# Patient Record
Sex: MALE | Race: OTHER | HISPANIC OR LATINO | Employment: FULL TIME | URBAN - METROPOLITAN AREA
[De-identification: names, ages, dates, MRNs, and addresses within clinical notes are randomized per-mention and may not be internally consistent; named-entity substitution may affect disease eponyms.]

---

## 2017-12-26 ENCOUNTER — APPOINTMENT (EMERGENCY)
Dept: ULTRASOUND IMAGING | Facility: HOSPITAL | Age: 42
End: 2017-12-26
Payer: COMMERCIAL

## 2017-12-26 ENCOUNTER — APPOINTMENT (EMERGENCY)
Dept: RADIOLOGY | Facility: HOSPITAL | Age: 42
End: 2017-12-26
Payer: COMMERCIAL

## 2017-12-26 ENCOUNTER — HOSPITAL ENCOUNTER (EMERGENCY)
Facility: HOSPITAL | Age: 42
Discharge: HOME | End: 2017-12-26
Attending: EMERGENCY MEDICINE | Admitting: EMERGENCY MEDICINE
Payer: COMMERCIAL

## 2017-12-26 VITALS
SYSTOLIC BLOOD PRESSURE: 138 MMHG | RESPIRATION RATE: 16 BRPM | HEART RATE: 84 BPM | OXYGEN SATURATION: 100 % | TEMPERATURE: 97.9 F | WEIGHT: 235.89 LBS | DIASTOLIC BLOOD PRESSURE: 86 MMHG

## 2017-12-26 DIAGNOSIS — M79.673 HEEL PAIN: Primary | ICD-10-CM

## 2017-12-26 PROCEDURE — 93971 EXTREMITY STUDY: CPT

## 2017-12-26 PROCEDURE — 73630 X-RAY EXAM OF FOOT: CPT

## 2017-12-26 PROCEDURE — 73600 X-RAY EXAM OF ANKLE: CPT

## 2017-12-26 PROCEDURE — 96372 THER/PROPH/DIAG INJ SC/IM: CPT

## 2017-12-26 PROCEDURE — 99284 EMERGENCY DEPT VISIT MOD MDM: CPT

## 2017-12-26 RX ORDER — KETOROLAC TROMETHAMINE 30 MG/ML
15 INJECTION, SOLUTION INTRAMUSCULAR; INTRAVENOUS ONCE
Status: COMPLETED | OUTPATIENT
Start: 2017-12-26 | End: 2017-12-26

## 2017-12-26 RX ORDER — ACETAMINOPHEN 325 MG/1
975 TABLET ORAL ONCE
Status: COMPLETED | OUTPATIENT
Start: 2017-12-26 | End: 2017-12-26

## 2017-12-26 RX ADMIN — ACETAMINOPHEN 975 MG: 325 TABLET ORAL at 14:13

## 2017-12-26 RX ADMIN — KETOROLAC TROMETHAMINE 15 MG: 30 INJECTION, SOLUTION INTRAMUSCULAR at 14:14

## 2017-12-26 NOTE — ED PROVIDER NOTES
History  Chief Complaint   Patient presents with    Foot Pain     left foot and ankle pain for one month     Patient is a 14-year-old male that reports to the emergency department with swelling and pain in his left lower extremity for the past month  He has point tenderness along the insertion of the Achilles tendon with no other areas of tenderness  However he does have some swelling throughout the entire ankle and up the calf several inches  No tenderness further up the calf or calf pain  No chest pain or shortness of breath  Heart rate normal during my exam   No systemic symptoms  Feeling well otherwise  He notes that he may have bumped the back of his heel about a month ago but does not recall the exact mechanism  Medical decision making:  14-year-old male, suspect that he has Achilles tendon inflammation along the insertion of the Achilles tendon, however, will rule out fracture with x-ray as well as DVT  None       History reviewed  No pertinent past medical history  Past Surgical History:   Procedure Laterality Date    APPENDECTOMY         History reviewed  No pertinent family history  I have reviewed and agree with the history as documented  Social History   Substance Use Topics    Smoking status: Never Smoker    Smokeless tobacco: Never Used    Alcohol use No        Review of Systems   Constitutional: Negative for chills and fever  HENT: Negative for ear pain and hearing loss  Respiratory: Negative for chest tightness and shortness of breath  Cardiovascular: Negative for chest pain and leg swelling  Gastrointestinal: Negative for abdominal pain, diarrhea and nausea  Genitourinary: Negative for dysuria and hematuria  Musculoskeletal: Negative for joint swelling and neck stiffness  Skin: Negative for rash  Neurological: Negative for seizures and headaches  Psychiatric/Behavioral: Negative for hallucinations and suicidal ideas     All other systems reviewed and are negative  Physical Exam  ED Triage Vitals [12/26/17 1324]   Temperature Pulse Respirations Blood Pressure SpO2   97 9 °F (36 6 °C) (!) 107 20 154/92 99 %      Temp Source Heart Rate Source Patient Position - Orthostatic VS BP Location FiO2 (%)   Oral Monitor Sitting Right arm --      Pain Score       Worst Possible Pain           Orthostatic Vital Signs  Vitals:    12/26/17 1324 12/26/17 1615   BP: 154/92 138/86   Pulse: (!) 107 84   Patient Position - Orthostatic VS: Sitting        Physical Exam   Constitutional: He is oriented to person, place, and time  He appears well-developed and well-nourished  HENT:   Head: Normocephalic and atraumatic  Eyes: EOM are normal  Pupils are equal, round, and reactive to light  Neck: Normal range of motion  Neck supple  Cardiovascular: Normal rate, regular rhythm and normal heart sounds  No murmur heard  Pulmonary/Chest: Effort normal and breath sounds normal  No respiratory distress  He has no wheezes  Abdominal: Soft  Bowel sounds are normal  He exhibits no distension  There is no tenderness  Musculoskeletal: Normal range of motion  He exhibits edema and tenderness  Neurological: He is alert and oriented to person, place, and time  No cranial nerve deficit  Coordination normal    Skin: Skin is warm and dry  He is not diaphoretic  No erythema  Psychiatric: He has a normal mood and affect  His behavior is normal    Nursing note and vitals reviewed  ED Medications  Medications   ketorolac (TORADOL) injection 15 mg (15 mg Intramuscular Given 12/26/17 1414)   acetaminophen (TYLENOL) tablet 975 mg (975 mg Oral Given 12/26/17 1413)       Diagnostic Studies  Results Reviewed     None                 VAS lower limb venous duplex study, unilateral/limited   Final Result by Suresh Graff MD (12/27 0011)      XR foot 3+ views LEFT   Final Result by Emmett Peterson MD (12/26 1506)      No acute osseous abnormality           Workstation performed: [id number]         XR ankle 2 views LEFT   Final Result by Tiffany Gutierrez MD (12/26 1486)      No acute osseous abnormality  Workstation performed: [id number]                    Procedures  Procedures       Phone Contacts  ED Phone Contact    ED Course  ED Course                                MDM  CritCare Time    Disposition  Final diagnoses:   Heel pain     Time reflects when diagnosis was documented in both MDM as applicable and the Disposition within this note     Time User Action Codes Description Comment    12/26/2017  4:03 PM Qian Ramirez Add [X88 [id number]] Heel pain       ED Disposition     ED Disposition Condition Comment    Discharge  [address] [address] discharge to home/self care  Condition at discharge: Good        Follow-up Information     Follow up With Specialties Details [facility] [address]  In 1 day  [address]  [phone]        There are no discharge medications for this patient  No discharge procedures on file      ED Provider  Electronically Signed by           Fely Saldivar MD  12/28/17 7588

## 2017-12-26 NOTE — ED NOTES
US at bedside      Jayna Wright [facility]  12/26/17 1522

## 2017-12-26 NOTE — DISCHARGE INSTRUCTIONS
Arthralgia   WHAT YOU NEED TO KNOW:   Arthralgia is pain in one or more joints, with no inflammation  It may be short-term and get better within 6 to 8 weeks  Arthralgia can be an early sign of arthritis  Arthralgia may be caused by a medical condition, such as a hormone disorder or a tumor  It may also be caused by an infection or injury  DISCHARGE INSTRUCTIONS:   Medicines: The following medicines may  be ordered for you:  · Acetaminophen  decreases pain  Ask how much to take and how often to take it  Follow directions  Acetaminophen can cause liver damage if not taken correctly  · NSAIDs  decrease pain and prevent swelling  Ask your healthcare provider which medicine is right for you  Ask how much to take and when to take it  Take as directed  NSAIDs can cause stomach bleeding and kidney problems if not taken correctly  · Pain relief cream  decreases pain  Use this cream as directed  · Take your medicine as directed  Contact your healthcare provider if you think your medicine is not helping or if you have side effects  Tell him of her if you are allergic to any medicine  Keep a list of the medicines, vitamins, and herbs you take  Include the amounts, and when and why you take them  Bring the list or the pill bottles to follow-up visits  Carry your medicine list with you in case of an emergency  Follow up with your healthcare provider or specialist as directed:  Write down your questions so you remember to ask them during your visits  Self-care:   · Apply heat  to help decrease pain  Use a heating pad or heat wrap  Apply heat for 20 to 30 minutes every 2 hours for as many days as directed  · Rest  as much as possible  Avoid activities that cause joint pain  · Apply ice  to help decrease swelling and pain  Ice may also help prevent tissue damage  Use an ice pack, or put crushed ice in a plastic bag   Cover it with a towel and place it on your painful joint for 15 to 20 minutes every hour or as directed  · Support  the joint with a brace or elastic wrap as directed  · Elevate  your joint above the level of your heart as often as you can to help decrease swelling and pain  Prop your painful joint on pillows or blankets to keep it elevated comfortably  · Lose weight  if you are overweight  Extra weight can put pressure on your joints and cause more pain  Ask your healthcare provider how much you should weigh  Ask him to help you create a weight loss plan  · Exercise  regularly to help improve joint movement and to decrease pain  Ask about the best exercise plan for you  Low-impact exercises can help take the pressure off your joints  Examples are walking, swimming, and water aerobics  Physical therapy:  A physical therapist teaches you exercises to help improve movement and strength, and to decrease pain  Ask your healthcare provider if physical therapy is right for you  Contact your healthcare provider or specialist if:   · You have a fever  · You continue to have joint pain that cannot be relieved with heat, ice, or medicine  · You have pain and inflammation around your joint  · You have questions or concerns about your condition or care  Return to the emergency department if:   · You have sudden, severe pain when you move your joint  · You have a fever and shaking chills  · You cannot move your joint  · You lose feeling on the side of your body where you have the painful joint  © 2017 [employer]  Information is for End User's use only and may not be sold, redistributed or otherwise used for commercial purposes  All illustrations and images included in CareNotes® are the copyrighted property of A D A M [employer] Inc  or [employer] Carras[employer]  The above information is an  only  It is not intended as medical advice for individual conditions or treatments   Talk to your doctor, nurse or pharmacist before following any medical regimen to see if it is safe and effective for you

## 2022-01-10 ENCOUNTER — APPOINTMENT (EMERGENCY)
Dept: CT IMAGING | Facility: HOSPITAL | Age: 47
DRG: 177 | End: 2022-01-10
Payer: COMMERCIAL

## 2022-01-10 ENCOUNTER — APPOINTMENT (EMERGENCY)
Dept: RADIOLOGY | Facility: HOSPITAL | Age: 47
DRG: 177 | End: 2022-01-10
Payer: COMMERCIAL

## 2022-01-10 ENCOUNTER — HOSPITAL ENCOUNTER (INPATIENT)
Facility: HOSPITAL | Age: 47
LOS: 5 days | Discharge: HOME | DRG: 177 | End: 2022-01-15
Attending: EMERGENCY MEDICINE | Admitting: HOSPITALIST
Payer: COMMERCIAL

## 2022-01-10 DIAGNOSIS — E87.6 HYPOKALEMIA: ICD-10-CM

## 2022-01-10 DIAGNOSIS — U07.1 ACUTE HYPOXEMIC RESPIRATORY FAILURE DUE TO COVID-19 (HCC): Primary | ICD-10-CM

## 2022-01-10 DIAGNOSIS — J96.01 ACUTE HYPOXEMIC RESPIRATORY FAILURE DUE TO COVID-19 (HCC): Primary | ICD-10-CM

## 2022-01-10 DIAGNOSIS — E87.1 HYPONATREMIA: ICD-10-CM

## 2022-01-10 PROBLEM — E66.01 MORBID OBESITY WITH BMI OF 40.0-44.9, ADULT (HCC): Status: ACTIVE | Noted: 2022-01-10

## 2022-01-10 LAB
2HR DELTA HS TROPONIN: -7 NG/L
ALBUMIN SERPL BCP-MCNC: 2.6 G/DL (ref 3.5–5)
ALP SERPL-CCNC: 111 U/L (ref 46–116)
ALT SERPL W P-5'-P-CCNC: 72 U/L (ref 12–78)
ANION GAP SERPL CALCULATED.3IONS-SCNC: 10 MMOL/L (ref 4–13)
ANISOCYTOSIS BLD QL SMEAR: PRESENT
APTT PPP: 28 SECONDS (ref 23–37)
AST SERPL W P-5'-P-CCNC: 58 U/L (ref 5–45)
BACTERIA UR QL AUTO: ABNORMAL /HPF
BASOPHILS # BLD MANUAL: 0 THOUSAND/UL (ref 0–0.1)
BASOPHILS NFR MAR MANUAL: 0 % (ref 0–1)
BILIRUB SERPL-MCNC: 1.9 MG/DL (ref 0.2–1)
BILIRUB UR QL STRIP: ABNORMAL
BUN SERPL-MCNC: 24 MG/DL (ref 5–25)
CALCIUM ALBUM COR SERPL-MCNC: 10 MG/DL (ref 8.3–10.1)
CALCIUM SERPL-MCNC: 8.9 MG/DL (ref 8.3–10.1)
CARDIAC TROPONIN I PNL SERPL HS: 10 NG/L
CARDIAC TROPONIN I PNL SERPL HS: 17 NG/L
CHLORIDE SERPL-SCNC: 90 MMOL/L (ref 100–108)
CLARITY UR: CLEAR
CO2 SERPL-SCNC: 30 MMOL/L (ref 21–32)
COLOR UR: YELLOW
CREAT SERPL-MCNC: 1.41 MG/DL (ref 0.6–1.3)
D DIMER PPP FEU-MCNC: 0.99 UG/ML FEU
EOSINOPHIL # BLD MANUAL: 0.11 THOUSAND/UL (ref 0–0.4)
EOSINOPHIL NFR BLD MANUAL: 1 % (ref 0–6)
ERYTHROCYTE [DISTWIDTH] IN BLOOD BY AUTOMATED COUNT: 13.6 % (ref 11.6–15.1)
FINE GRAN CASTS URNS QL MICRO: ABNORMAL /LPF
FLUAV RNA RESP QL NAA+PROBE: NEGATIVE
FLUBV RNA RESP QL NAA+PROBE: NEGATIVE
GFR SERPL CREATININE-BSD FRML MDRD: 58 ML/MIN/1.73SQ M
GLUCOSE SERPL-MCNC: 124 MG/DL (ref 65–140)
GLUCOSE UR STRIP-MCNC: NEGATIVE MG/DL
HCT VFR BLD AUTO: 45.3 % (ref 36.5–49.3)
HGB BLD-MCNC: 15.6 G/DL (ref 12–17)
HGB UR QL STRIP.AUTO: ABNORMAL
INR PPP: 1.04 (ref 0.84–1.19)
KETONES UR STRIP-MCNC: NEGATIVE MG/DL
LACTATE SERPL-SCNC: 1.5 MMOL/L (ref 0.5–2)
LACTATE SERPL-SCNC: 2.1 MMOL/L (ref 0.5–2)
LEUKOCYTE ESTERASE UR QL STRIP: NEGATIVE
LYMPHOCYTES # BLD AUTO: 0.99 THOUSAND/UL (ref 0.6–4.47)
LYMPHOCYTES # BLD AUTO: 9 % (ref 14–44)
MAGNESIUM SERPL-MCNC: 2.6 MG/DL (ref 1.6–2.6)
MCH RBC QN AUTO: 31.1 PG (ref 26.8–34.3)
MCHC RBC AUTO-ENTMCNC: 34.4 G/DL (ref 31.4–37.4)
MCV RBC AUTO: 90 FL (ref 82–98)
MONOCYTES # BLD AUTO: 1.32 THOUSAND/UL (ref 0–1.22)
MONOCYTES NFR BLD: 12 % (ref 4–12)
NEUTROPHILS # BLD MANUAL: 8.59 THOUSAND/UL (ref 1.85–7.62)
NEUTS SEG NFR BLD AUTO: 78 % (ref 43–75)
NITRITE UR QL STRIP: NEGATIVE
NON-SQ EPI CELLS URNS QL MICRO: ABNORMAL /HPF
NT-PROBNP SERPL-MCNC: 46 PG/ML
PH UR STRIP.AUTO: 6.5 [PH]
PLATELET # BLD AUTO: 499 THOUSANDS/UL (ref 149–390)
PLATELET BLD QL SMEAR: ABNORMAL
PMV BLD AUTO: 9.6 FL (ref 8.9–12.7)
POTASSIUM SERPL-SCNC: 2.7 MMOL/L (ref 3.5–5.3)
PROT SERPL-MCNC: 8.5 G/DL (ref 6.4–8.2)
PROT UR STRIP-MCNC: ABNORMAL MG/DL
PROTHROMBIN TIME: 13.2 SECONDS (ref 11.6–14.5)
RBC # BLD AUTO: 5.01 MILLION/UL (ref 3.88–5.62)
RBC #/AREA URNS AUTO: ABNORMAL /HPF
RSV RNA RESP QL NAA+PROBE: NEGATIVE
SARS-COV-2 RNA RESP QL NAA+PROBE: POSITIVE
SODIUM SERPL-SCNC: 130 MMOL/L (ref 136–145)
SP GR UR STRIP.AUTO: 1.01 (ref 1–1.03)
UROBILINOGEN UR QL STRIP.AUTO: 0.2 E.U./DL
WBC # BLD AUTO: 11.01 THOUSAND/UL (ref 4.31–10.16)
WBC #/AREA URNS AUTO: ABNORMAL /HPF
WBC CASTS URNS QL MICRO: ABNORMAL /LPF

## 2022-01-10 PROCEDURE — G1004 CDSM NDSC: HCPCS

## 2022-01-10 PROCEDURE — 96361 HYDRATE IV INFUSION ADD-ON: CPT

## 2022-01-10 PROCEDURE — 83880 ASSAY OF NATRIURETIC PEPTIDE: CPT | Performed by: EMERGENCY MEDICINE

## 2022-01-10 PROCEDURE — 83605 ASSAY OF LACTIC ACID: CPT | Performed by: EMERGENCY MEDICINE

## 2022-01-10 PROCEDURE — 85730 THROMBOPLASTIN TIME PARTIAL: CPT | Performed by: EMERGENCY MEDICINE

## 2022-01-10 PROCEDURE — 85007 BL SMEAR W/DIFF WBC COUNT: CPT | Performed by: EMERGENCY MEDICINE

## 2022-01-10 PROCEDURE — 71045 X-RAY EXAM CHEST 1 VIEW: CPT

## 2022-01-10 PROCEDURE — 83735 ASSAY OF MAGNESIUM: CPT | Performed by: EMERGENCY MEDICINE

## 2022-01-10 PROCEDURE — 99285 EMERGENCY DEPT VISIT HI MDM: CPT

## 2022-01-10 PROCEDURE — 94760 N-INVAS EAR/PLS OXIMETRY 1: CPT

## 2022-01-10 PROCEDURE — 99285 EMERGENCY DEPT VISIT HI MDM: CPT | Performed by: EMERGENCY MEDICINE

## 2022-01-10 PROCEDURE — 0241U HB NFCT DS VIR RESP RNA 4 TRGT: CPT | Performed by: EMERGENCY MEDICINE

## 2022-01-10 PROCEDURE — 85610 PROTHROMBIN TIME: CPT | Performed by: EMERGENCY MEDICINE

## 2022-01-10 PROCEDURE — 99223 1ST HOSP IP/OBS HIGH 75: CPT | Performed by: HOSPITALIST

## 2022-01-10 PROCEDURE — 36415 COLL VENOUS BLD VENIPUNCTURE: CPT | Performed by: EMERGENCY MEDICINE

## 2022-01-10 PROCEDURE — 85379 FIBRIN DEGRADATION QUANT: CPT | Performed by: EMERGENCY MEDICINE

## 2022-01-10 PROCEDURE — 80053 COMPREHEN METABOLIC PANEL: CPT | Performed by: EMERGENCY MEDICINE

## 2022-01-10 PROCEDURE — 85027 COMPLETE CBC AUTOMATED: CPT | Performed by: EMERGENCY MEDICINE

## 2022-01-10 PROCEDURE — 96360 HYDRATION IV INFUSION INIT: CPT

## 2022-01-10 PROCEDURE — XW033E5 INTRODUCTION OF REMDESIVIR ANTI-INFECTIVE INTO PERIPHERAL VEIN, PERCUTANEOUS APPROACH, NEW TECHNOLOGY GROUP 5: ICD-10-PCS | Performed by: HOSPITALIST

## 2022-01-10 PROCEDURE — 71275 CT ANGIOGRAPHY CHEST: CPT

## 2022-01-10 PROCEDURE — 81001 URINALYSIS AUTO W/SCOPE: CPT | Performed by: EMERGENCY MEDICINE

## 2022-01-10 PROCEDURE — 84484 ASSAY OF TROPONIN QUANT: CPT | Performed by: EMERGENCY MEDICINE

## 2022-01-10 PROCEDURE — 93005 ELECTROCARDIOGRAM TRACING: CPT

## 2022-01-10 RX ORDER — DEXAMETHASONE SODIUM PHOSPHATE 4 MG/ML
6 INJECTION, SOLUTION INTRA-ARTICULAR; INTRALESIONAL; INTRAMUSCULAR; INTRAVENOUS; SOFT TISSUE EVERY 24 HOURS
Status: DISCONTINUED | OUTPATIENT
Start: 2022-01-10 | End: 2022-01-15 | Stop reason: HOSPADM

## 2022-01-10 RX ORDER — ACETAMINOPHEN 325 MG/1
650 TABLET ORAL EVERY 4 HOURS PRN
Status: DISCONTINUED | OUTPATIENT
Start: 2022-01-10 | End: 2022-01-15 | Stop reason: HOSPADM

## 2022-01-10 RX ORDER — FAMOTIDINE 20 MG/1
20 TABLET, FILM COATED ORAL 2 TIMES DAILY
Status: DISCONTINUED | OUTPATIENT
Start: 2022-01-10 | End: 2022-01-15 | Stop reason: HOSPADM

## 2022-01-10 RX ORDER — POTASSIUM CHLORIDE AND SODIUM CHLORIDE 900; 300 MG/100ML; MG/100ML
100 INJECTION, SOLUTION INTRAVENOUS CONTINUOUS
Status: DISCONTINUED | OUTPATIENT
Start: 2022-01-10 | End: 2022-01-11

## 2022-01-10 RX ORDER — POTASSIUM CHLORIDE 20 MEQ/1
40 TABLET, EXTENDED RELEASE ORAL ONCE
Status: COMPLETED | OUTPATIENT
Start: 2022-01-10 | End: 2022-01-10

## 2022-01-10 RX ORDER — BENZONATATE 100 MG/1
100 CAPSULE ORAL 3 TIMES DAILY PRN
Status: DISCONTINUED | OUTPATIENT
Start: 2022-01-10 | End: 2022-01-15 | Stop reason: HOSPADM

## 2022-01-10 RX ORDER — SODIUM CHLORIDE 9 MG/ML
125 INJECTION, SOLUTION INTRAVENOUS CONTINUOUS
Status: DISCONTINUED | OUTPATIENT
Start: 2022-01-10 | End: 2022-01-10

## 2022-01-10 RX ORDER — POTASSIUM CHLORIDE 20 MEQ/1
40 TABLET, EXTENDED RELEASE ORAL 2 TIMES DAILY
Status: COMPLETED | OUTPATIENT
Start: 2022-01-10 | End: 2022-01-11

## 2022-01-10 RX ORDER — ALBUTEROL SULFATE 90 UG/1
2 AEROSOL, METERED RESPIRATORY (INHALATION) EVERY 4 HOURS PRN
Status: DISCONTINUED | OUTPATIENT
Start: 2022-01-10 | End: 2022-01-15 | Stop reason: HOSPADM

## 2022-01-10 RX ORDER — ONDANSETRON 2 MG/ML
4 INJECTION INTRAMUSCULAR; INTRAVENOUS EVERY 4 HOURS PRN
Status: DISCONTINUED | OUTPATIENT
Start: 2022-01-10 | End: 2022-01-15 | Stop reason: HOSPADM

## 2022-01-10 RX ADMIN — SODIUM CHLORIDE 125 ML/HR: 0.9 INJECTION, SOLUTION INTRAVENOUS at 12:47

## 2022-01-10 RX ADMIN — REMDESIVIR 200 MG: 100 INJECTION, POWDER, LYOPHILIZED, FOR SOLUTION INTRAVENOUS at 16:33

## 2022-01-10 RX ADMIN — IOHEXOL 100 ML: 350 INJECTION, SOLUTION INTRAVENOUS at 13:48

## 2022-01-10 RX ADMIN — BENZONATATE 100 MG: 100 CAPSULE ORAL at 20:46

## 2022-01-10 RX ADMIN — FAMOTIDINE 20 MG: 20 TABLET ORAL at 17:29

## 2022-01-10 RX ADMIN — ALBUTEROL SULFATE 2 PUFF: 90 AEROSOL, METERED RESPIRATORY (INHALATION) at 20:13

## 2022-01-10 RX ADMIN — POTASSIUM CHLORIDE 40 MEQ: 1500 TABLET, EXTENDED RELEASE ORAL at 15:05

## 2022-01-10 RX ADMIN — ENOXAPARIN SODIUM 40 MG: 40 INJECTION SUBCUTANEOUS at 20:46

## 2022-01-10 RX ADMIN — POTASSIUM CHLORIDE AND SODIUM CHLORIDE 100 ML/HR: 900; 300 INJECTION, SOLUTION INTRAVENOUS at 17:29

## 2022-01-10 RX ADMIN — POTASSIUM CHLORIDE 40 MEQ: 1500 TABLET, EXTENDED RELEASE ORAL at 17:29

## 2022-01-10 RX ADMIN — DEXAMETHASONE SODIUM PHOSPHATE 6 MG: 4 INJECTION, SOLUTION INTRA-ARTICULAR; INTRALESIONAL; INTRAMUSCULAR; INTRAVENOUS; SOFT TISSUE at 16:33

## 2022-01-10 NOTE — H&P
[facility] 1975[facility] 51 [facility]  [facility] MRN: [id number]  Unit/Bed#: ED 14 Encounter: [id number]  Primary Care Provider: No primary care provider on file  Date and time admitted to hospital: 1/10/2022 12:21 PM         [facility] - History and Physical:       Deshaun Baez 51 y o  male MRN: [id number]  Unit/Bed#: ED 14 Encounter: [id number]  Admitting Physician: Augusto Pink MD  PCP: No primary care provider on file  Date of Admission:  01/10/22        Assessment and Plan:     * Acute hypoxemic respiratory failure due to COVID-19 [facility])  Assessment & Plan  · Continue mid flow oxygen for now and will titrate as needed  · Continue isolation protocols  · Will start IV remdesivir and IV Decadron  · Tylenol as needed for fevers  · Albuterol inhalers needed for fevers  · Start patient on clear liquid diet and advance as tolerated  · Continue symptomatic management    Hyponatremia  Assessment & Plan  · Will hydrate with IV fluids for 12 hours  · Check labs in a m  Hypokalemia  Assessment & Plan  · Will order KCl oral and IV fluids with KCl times 12 hours  · Recheck labs in a m  Morbid obesity with BMI of 40 0-44 9, adult ([facility] [address] )  Assessment & Plan  · Outpatient follow-up            VTE Prophylaxis: Enoxaparin (Lovenox)  / sequential compression device   Code Status: full    Anticipated Length of Stay:  Patient will be admitted on an Inpatient basis with an anticipated length of stay of  2 midnights  Justification for Hospital Stay:  COVID-19    Total Time for Visit, including Counseling / Coordination of Care: 30 minutes  Greater than 50% of this total time spent on direct patient counseling and coordination of care  Chief Complaint:  COVID symptoms    History of Present Illness:    Deshaun Baez is a 51 y o  male who presented to the ED with complaints of shortness of breath that has been progressively worsening over the past week    He tested positive for COVID-19 on 12/26 and he did receive 2 doses of the Moderna vaccine  He went to the urgent care and was found hypoxic and was sent to the emergency room for further management  In the ED the patient again was found to be hypoxic and was started on mid flow oxygen  He did complain of poor appetite and generalized weakness and fatigue  He denied any fevers or chills  Review of Systems:    Review of Systems   Constitutional: Positive for appetite change and fatigue  HENT: Negative  Eyes: Negative  Respiratory: Positive for shortness of breath  Cardiovascular: Negative  Gastrointestinal: Negative  Endocrine: Negative  Genitourinary: Negative  Musculoskeletal: Negative  Skin: Negative  Neurological: Negative  Psychiatric/Behavioral: Negative  Past Medical and Surgical History:     No past medical history on file  Past Surgical History:   Procedure Laterality Date    APPENDECTOMY         Meds/Allergies:    Prior to Admission medications    Not on File     I have reviewed home medications with patient personally  Allergies: No Known Allergies    Social History:     Marital Status: Single     Social History     Substance and Sexual Activity   Alcohol Use No     Social History     Tobacco Use   Smoking Status Never Smoker   Smokeless Tobacco Never Used     Social History     Substance and Sexual Activity   Drug Use No       Family History:    non-contributory    Physical Exam:     Vitals:   Blood Pressure: 103/56 (01/10/22 1500)  Pulse: 97 (01/10/22 1500)  Temperature: 99 5 °F (37 5 °C) (01/10/22 1228)  Temp Source: Oral (01/10/22 1228)  Respirations: (!) 28 (01/10/22 1500)  Height: 5' 5" (165 1 cm) (01/10/22 1228)  Weight - Scale: 113 kg (250 lb) (01/10/22 1228)  SpO2: 92 % (01/10/22 1500)    Physical Exam  Constitutional:       Appearance: He is obese  HENT:      Head: Normocephalic and atraumatic  Eyes:      Extraocular Movements: Extraocular movements intact  Pupils: Pupils are equal, round, and reactive to light  Cardiovascular:      Rate and Rhythm: Normal rate and regular rhythm  Pulmonary:      Effort: Pulmonary effort is normal       Breath sounds: Normal breath sounds  Abdominal:      General: Bowel sounds are normal       Palpations: Abdomen is soft  Musculoskeletal:         General: Normal range of motion  Cervical back: Neck supple  Skin:     General: Skin is warm and dry  Neurological:      Mental Status: He is alert and oriented to person, place, and time  Psychiatric:         Mood and Affect: Mood normal        Additional Data:     Lab Results: I have personally reviewed pertinent reports  Results from last 7 days   Lab Units 01/10/22  1244   WBC Thousand/uL 11 01*   HEMOGLOBIN g/dL 15 6   HEMATOCRIT % 45 3   PLATELETS Thousands/uL 499*   LYMPHO PCT % 9*   MONO PCT % 12   EOS PCT % 1     Results from last 7 days   Lab Units 01/10/22  1244   SODIUM mmol/L 130*   POTASSIUM mmol/L 2 7*   CHLORIDE mmol/L 90*   CO2 mmol/L 30   BUN mg/dL 24   CREATININE mg/dL 1 41*   ANION GAP mmol/L 10   CALCIUM mg/dL 8 9   ALBUMIN g/dL 2 6*   TOTAL BILIRUBIN mg/dL 1 90*   ALK PHOS U/L 111   ALT U/L 72   AST U/L 58*   GLUCOSE RANDOM mg/dL 124     Results from last 7 days   Lab Units 01/10/22  1244   INR  1 04             Results from last 7 days   Lab Units 01/10/22  1508 01/10/22  1244   LACTIC ACID mmol/L 1 5 2 1*       Imaging: I have personally reviewed pertinent reports  CTA ED chest PE study   Final Result by Valeri Lynch MD (01/10 1423)      No evidence for pulmonary embolism  Multifocal areas of consolidation throughout the lungs bilaterally consistent with Covid-19 pneumonia  Workstation performed: [id number]         XR chest 1 view portable   ED Interpretation by Ivone Galicia MD (01/10 1323)   Diffuse significant infiltrates      Final Result by Taylor Phillips DO (01/10 1503)      Multilobar pneumonia    The right clinical setting, Covid pneumonia should be considered  Workstation performed: [id number]             Allscripts / [facility] Records Reviewed: Yes     ** Please Note: This note has been constructed using a voice recognition system   **

## 2022-01-10 NOTE — ED PROVIDER NOTES
History  Chief Complaint   Patient presents with    Shortness of Breath     pt c/o sob, states he went to urgent care and was told to come here for low oxygen  54yo male is coming in with c/o dyspnea, significant worsening over the past week  He tested positive for COVID at a pharmacy on 12/26  He got the COVID moderna vaccines via a pharmacy on 11/26 and 12/17  He was well for the first week and had more mild symptoms and everyone else had COVID and multiple people were sick, they all got better and recovered and he has just gotten worse and has gotten SOB  History provided by:  Patient  URI  Presenting symptoms: congestion and cough    Presenting symptoms: no fever    Congestion:     Location:  Chest  Cough:     Cough characteristics:  Productive    Sputum characteristics:  Nondescript    Severity:  Moderate    Onset quality:  Gradual    Duration:  2 weeks    Timing:  Constant    Progression:  Worsening  Severity:  Severe  Onset quality:  Gradual  Duration:  2 weeks  Timing:  Constant  Progression:  Worsening  Chronicity:  New  Relieved by:  Nothing  Worsened by:  Nothing  Ineffective treatments:  None tried  Associated symptoms: myalgias    Risk factors: sick contacts        None       No past medical history on file  Past Surgical History:   Procedure Laterality Date    APPENDECTOMY         No family history on file  I have reviewed and agree with the history as documented  E-Cigarette/Vaping     E-Cigarette/Vaping Substances     Social History     Tobacco Use    Smoking status: Never Smoker    Smokeless tobacco: Never Used   Substance Use Topics    Alcohol use: No    Drug use: No       Review of Systems   Constitutional: Negative for fever  HENT: Positive for congestion  Respiratory: Positive for cough  Musculoskeletal: Positive for myalgias  All other systems reviewed and are negative  Physical Exam  Physical Exam  Vitals reviewed     Constitutional:       Appearance: He is ill-appearing  HENT:      Head: Normocephalic and atraumatic  Eyes:      Extraocular Movements: Extraocular movements intact  Pupils: Pupils are equal, round, and reactive to light  Cardiovascular:      Rate and Rhythm: Tachycardia present  Pulmonary:      Effort: Tachypnea and accessory muscle usage present  Breath sounds: Rhonchi present  Abdominal:      General: There is no distension  Palpations: Abdomen is soft  Tenderness: There is no abdominal tenderness  Musculoskeletal:      Cervical back: Neck supple  Right lower leg: No edema  Left lower leg: No edema  Skin:     General: Skin is warm  Neurological:      General: No focal deficit present  Mental Status: He is alert     Psychiatric:         Mood and Affect: Mood normal          Vital Signs  ED Triage Vitals   Temperature Pulse Respirations Blood Pressure SpO2   01/10/22 1228 01/10/22 1218 01/10/22 1218 01/10/22 1218 01/10/22 1218   99 5 °F (37 5 °C) (!) 106 (!) 26 146/82 (!) 80 %      Temp Source Heart Rate Source Patient Position - Orthostatic VS BP Location FiO2 (%)   01/10/22 1228 01/10/22 1218 01/10/22 1218 01/10/22 1218 --   Oral Monitor Sitting Left arm       Pain Score       01/10/22 1228       No Pain           Vitals:    01/10/22 1218 01/10/22 1228 01/10/22 1500   BP: 146/82 111/72 103/56   Pulse: (!) 106 101 97   Patient Position - Orthostatic VS: Sitting           Visual Acuity      ED Medications  Medications   sodium chloride 0 9 % infusion (125 mL/hr Intravenous New Bag 1/10/22 1247)   iohexol (OMNIPAQUE) 350 MG/ML injection (SINGLE-DOSE) 100 mL (100 mL Intravenous Given 1/10/22 1348)   potassium chloride (K-DUR,KLOR-CON) CR tablet 40 mEq (40 mEq Oral Given 1/10/22 1505)       Diagnostic Studies  Results Reviewed     Procedure Component Value Units Date/Time    HS Troponin I 4hr [[id number]]     Lab Status: No result Specimen: Blood     COVID/FLU/RSV - 2 hour TAT [[id number]]  (Abnormal) Collected: 01/10/22 1245    Lab Status: Final result Specimen: Nares from Nose Updated: 01/10/22 1434     SARS-CoV-2 Positive     INFLUENZA A PCR Negative     INFLUENZA B PCR Negative     RSV PCR Negative    Narrative:      FOR PEDIATRIC PATIENTS - copy/paste COVID Guidelines URL to browser: [email or web address]    SARS-CoV-2 assay is a Nucleic Acid Amplification assay intended for the  qualitative detection of nucleic acid from SARS-CoV-2 in nasopharyngeal  swabs  Results are for the presumptive identification of SARS-CoV-2 RNA  Positive results are indicative of infection with SARS-CoV-2, the virus  causing COVID-19, but do not rule out bacterial infection or co-infection  with other viruses  Laboratories within the [place] and its  territories are required to report all positive results to the appropriate  public health authorities  Negative results do not preclude SARS-CoV-2  infection and should not be used as the sole basis for treatment or other  patient management decisions  Negative results must be combined with  clinical observations, patient history, and epidemiological information  This test has not been FDA cleared or approved  This test has been authorized by FDA under an Emergency Use Authorization  (EUA)  This test is only authorized for the duration of time the  declaration that circumstances exist justifying the authorization of the  emergency use of an in vitro diagnostic tests for detection of SARS-CoV-2  virus and/or diagnosis of COVID-19 infection under section 564(b)(1) of  the Act, 21 U  S C  053IQE-9(V)(1), unless the authorization is terminated  or revoked sooner  The test has been validated but independent review by FDA  and CLIA is pending  Test performed using [employer] GeneXpert: This RT-PCR assay targets N2,  a region unique to SARS-CoV-2   A conserved region in the E-gene was chosen  for pan-Sarbecovirus detection which includes SARS-CoV-2  Lactic acid [[id number]]  (Abnormal) Collected: 01/10/22 1244    Lab Status: Final result Specimen: Blood from Arm, Left Updated: 01/10/22 1337     LACTIC ACID 2 1 mmol/L     Narrative:      Result may be elevated if tourniquet was used during collection  Lactic acid 2 Hours [[id number]]     Lab Status: No result Specimen: Blood     CBC and differential [[id number]]  (Abnormal) Collected: 01/10/22 1244    Lab Status: Final result Specimen: Blood from Arm, Left Updated: 01/10/22 1336     WBC 11 01 Thousand/uL      RBC 5 01 Million/uL      Hemoglobin 15 6 g/dL      Hematocrit 45 3 %      MCV 90 fL      MCH 31 1 pg      MCHC 34 4 g/dL      RDW 13 6 %      MPV 9 6 fL      Platelets 363 Thousands/uL     Narrative: This is an appended report  These results have been appended to a previously verified report      Manual Differential(PHLEBS Do Not Order) [[id number]]  (Abnormal) Collected: 01/10/22 1244    Lab Status: Final result Specimen: Blood from Arm, Left Updated: 01/10/22 1336     Segmented % 78 %      Lymphocytes % 9 %      Monocytes % 12 %      Eosinophils, % 1 %      Basophils % 0 %      Absolute Neutrophils 8 59 Thousand/uL      Lymphocytes Absolute 0 99 Thousand/uL      Monocytes Absolute 1 32 Thousand/uL      Eosinophils Absolute 0 11 Thousand/uL      Basophils Absolute 0 00 Thousand/uL      Total Counted --     Anisocytosis Present     Platelet Estimate Increased    Comprehensive metabolic panel [[id number]]  (Abnormal) Collected: 01/10/22 1244    Lab Status: Final result Specimen: Blood from Arm, Left Updated: 01/10/22 1330     Sodium 130 mmol/L      Potassium 2 7 mmol/L      Chloride 90 mmol/L      CO2 30 mmol/L      ANION GAP 10 mmol/L      BUN 24 mg/dL      Creatinine 1 41 mg/dL      Glucose 124 mg/dL      Calcium 8 9 mg/dL      Corrected Calcium 10 0 mg/dL      AST 58 U/L      ALT 72 U/L      Alkaline Phosphatase 111 U/L      Total Protein 8 5 g/dL      Albumin 2 6 g/dL      Total Bilirubin 1 90 mg/dL      eGFR 58 ml/min/1 73sq m     Narrative:      [place] guidelines for Chronic Kidney Disease (CKD):     Stage 1 with normal or high GFR (GFR > 90 mL/min/1 73 square meters)    Stage 2 Mild CKD (GFR = 60-89 mL/min/1 73 square meters)    Stage 3A Moderate CKD (GFR = 45-59 mL/min/1 73 square meters)    Stage 3B Moderate CKD (GFR = 30-44 mL/min/1 73 square meters)    Stage 4 Severe CKD (GFR = 15-29 mL/min/1 73 square meters)    Stage 5 End Stage CKD (GFR <15 mL/min/1 73 square meters)  Note: GFR calculation is accurate only with a steady state creatinine    Magnesium [[id number]]  (Normal) Collected: 01/10/22 1244    Lab Status: Final result Specimen: Blood from Arm, Left Updated: 01/10/22 1321     Magnesium 2 6 mg/dL     NT-BNP PRO [[id number]]  (Normal) Collected: 01/10/22 1244    Lab Status: Final result Specimen: Blood from Arm, Left Updated: 01/10/22 1321     NT-proBNP 46 pg/mL     D-Dimer [[id number]]  (Abnormal) Collected: 01/10/22 1244    Lab Status: Final result Specimen: Blood from Arm, Left Updated: 01/10/22 1321     D-Dimer, Quant 0 99 ug/ml FEU     HS Troponin 0hr (reflex protocol) [[id number]]  (Normal) Collected: 01/10/22 1244    Lab Status: Final result Specimen: Blood from Arm, Left Updated: 01/10/22 1319     hs TnI 0hr 17 ng/L     HS Troponin I 2hr [[id number]]     Lab Status: No result Specimen: Blood     Protime-INR [[id number]]  (Normal) Collected: 01/10/22 1244    Lab Status: Final result Specimen: Blood from Arm, Left Updated: 01/10/22 1307     Protime 13 2 seconds      INR 1 04    APTT [[id number]]  (Normal) Collected: 01/10/22 1244    Lab Status: Final result Specimen: Blood from Arm, Left Updated: 01/10/22 1307     PTT 28 seconds     UA w Reflex to Microscopic w Reflex to Culture [[id number]]     Lab Status: No result Specimen: Urine                  CTA ED chest PE study   Final Result by Cailin Torres MD (01/10 1764)      No evidence for pulmonary embolism  Multifocal areas of consolidation throughout the lungs bilaterally consistent with Covid-19 pneumonia  Workstation performed: [id number]         XR chest 1 view portable   ED Interpretation by Ivone Galicia MD (01/10 1323)   Diffuse significant infiltrates      Final Result by Taylor Phillips DO (01/10 1503)      Multilobar pneumonia  The right clinical setting, Covid pneumonia should be considered  Workstation performed: [id number]                    Procedures  ECG 12 Lead Documentation Only    Date/Time: 1/10/2022 3:07 PM  Performed by: Ivone Galicia MD  Authorized by: Ivone Galicia MD     Indications / Diagnosis:  Dysopnea  ECG reviewed by me, the ED Provider: yes    Patient location:  ED  Rate:     ECG rate:  96    ECG rate assessment: normal    Rhythm:     Rhythm: sinus rhythm    Conduction:     Conduction: normal    T waves:     T waves: normal               ED Course  ED Course as of 01/10/22 1507   Mon Rafi 10, 2022   1456 Respirations(!): 26   1456 SpO2(!): 80 %   1456 SARS-COV-2(!): Positive   1456 Sodium(!): 130   1456 Potassium(!!): 2 7   1456 Chloride(!): 90                               SBIRT 20yo+      Most Recent Value   SBIRT (23 yo +)    In order to provide better care to our patients, we are screening all of our patients for alcohol and drug use  Would it be okay to ask you these screening questions?  No Filed at: 01/10/2022 1227                    MDM  Number of Diagnoses or Management Options  Acute hypoxemic respiratory failure due to COVID-19 [facility]): new and requires workup  Hypokalemia: new and requires workup     Amount and/or Complexity of Data Reviewed  Clinical lab tests: ordered and reviewed  Tests in the radiology section of CPT®: ordered and reviewed  Independent visualization of images, tracings, or specimens: yes    Risk of Complications, Morbidity, and/or Mortality  Presenting problems: high  Management options: high        Disposition  Final diagnoses:   [facility]emic [facility])   Hyponatremia   Hypokalemia     Time reflects when diagnosis was documented in both MDM as applicable and the Disposition within this note     Time User Action Codes Description Comment    1/10/2022 12:46 PM Essie, [address] [U07 1,  J96 01] Acute hypoxemic respiratory failure [facility])     1/10/2022  3:01 PM David Smoker Add [E87 1] Hyponatremia     1/10/2022  3:01 PM Suzette SMITH Add [E87 6] Hypokalemia       ED Disposition     ED Disposition Condition Date/Time Comment    Admit Stable Mon Rafi 10, 2022 12:46 PM       Follow-up Information    None         Patient's Medications    No medications on file       No discharge procedures on file      PDMP Review     None          ED Provider  Electronically Signed by           Regina Gonzalez MD  01/10/22 Marcia Kim MD  01/10/22 5191

## 2022-01-10 NOTE — RESPIRATORY THERAPY NOTE
01/10/22 1301   Respiratory Assessment   Assessment Type Assess only   General Appearance Alert; Awake   Respiratory Pattern Tachypneic   Chest Assessment Chest expansion symmetrical   Bilateral Breath Sounds Diminished;Clear   Cough None   Resp Comments Called to place pt on [address] [facility] due to decreased sats  Will wean as able     O2 Device MFNC   Oxygen Therapy/Pulse Ox   O2 Device Mid flow nasal cannula   O2 Therapy Oxygen humidified   Nasal Cannula O2 Flow Rate (L/min) 10 L/min   Calculated FIO2 (%) - Nasal Cannula 60   SpO2 93 %   SpO2 Activity At Rest   $ Pulse Oximetry Spot Check Charge Completed

## 2022-01-10 NOTE — ASSESSMENT & PLAN NOTE
· Continue mid flow oxygen for now and will titrate as needed  · Continue isolation protocols  · Will start IV remdesivir and IV Decadron  · Tylenol as needed for fevers  · Albuterol inhalers needed for fevers  · Start patient on clear liquid diet and advance as tolerated  · Continue symptomatic management

## 2022-01-11 LAB
ALBUMIN SERPL BCP-MCNC: 2.4 G/DL (ref 3.5–5)
ALP SERPL-CCNC: 105 U/L (ref 46–116)
ALT SERPL W P-5'-P-CCNC: 73 U/L (ref 12–78)
ANION GAP SERPL CALCULATED.3IONS-SCNC: 9 MMOL/L (ref 4–13)
AST SERPL W P-5'-P-CCNC: 47 U/L (ref 5–45)
BILIRUB SERPL-MCNC: 1.25 MG/DL (ref 0.2–1)
BUN SERPL-MCNC: 16 MG/DL (ref 5–25)
CALCIUM ALBUM COR SERPL-MCNC: 10.2 MG/DL (ref 8.3–10.1)
CALCIUM SERPL-MCNC: 8.9 MG/DL (ref 8.3–10.1)
CHLORIDE SERPL-SCNC: 95 MMOL/L (ref 100–108)
CO2 SERPL-SCNC: 29 MMOL/L (ref 21–32)
CREAT SERPL-MCNC: 1.05 MG/DL (ref 0.6–1.3)
ERYTHROCYTE [DISTWIDTH] IN BLOOD BY AUTOMATED COUNT: 13.4 % (ref 11.6–15.1)
GFR SERPL CREATININE-BSD FRML MDRD: 84 ML/MIN/1.73SQ M
GLUCOSE SERPL-MCNC: 194 MG/DL (ref 65–140)
HCT VFR BLD AUTO: 43.1 % (ref 36.5–49.3)
HGB BLD-MCNC: 15 G/DL (ref 12–17)
MAGNESIUM SERPL-MCNC: 2.4 MG/DL (ref 1.6–2.6)
MCH RBC QN AUTO: 31.8 PG (ref 26.8–34.3)
MCHC RBC AUTO-ENTMCNC: 34.8 G/DL (ref 31.4–37.4)
MCV RBC AUTO: 92 FL (ref 82–98)
PLATELET # BLD AUTO: 520 THOUSANDS/UL (ref 149–390)
PMV BLD AUTO: 9.6 FL (ref 8.9–12.7)
POTASSIUM SERPL-SCNC: 3.3 MMOL/L (ref 3.5–5.3)
PROT SERPL-MCNC: 7.9 G/DL (ref 6.4–8.2)
RBC # BLD AUTO: 4.71 MILLION/UL (ref 3.88–5.62)
SODIUM SERPL-SCNC: 133 MMOL/L (ref 136–145)
WBC # BLD AUTO: 8.25 THOUSAND/UL (ref 4.31–10.16)

## 2022-01-11 PROCEDURE — 99233 SBSQ HOSP IP/OBS HIGH 50: CPT | Performed by: INTERNAL MEDICINE

## 2022-01-11 PROCEDURE — 80053 COMPREHEN METABOLIC PANEL: CPT | Performed by: HOSPITALIST

## 2022-01-11 PROCEDURE — 85027 COMPLETE CBC AUTOMATED: CPT | Performed by: HOSPITALIST

## 2022-01-11 PROCEDURE — 83735 ASSAY OF MAGNESIUM: CPT | Performed by: HOSPITALIST

## 2022-01-11 PROCEDURE — 36415 COLL VENOUS BLD VENIPUNCTURE: CPT | Performed by: HOSPITALIST

## 2022-01-11 PROCEDURE — 94760 N-INVAS EAR/PLS OXIMETRY 1: CPT

## 2022-01-11 RX ORDER — POTASSIUM CHLORIDE 20 MEQ/1
40 TABLET, EXTENDED RELEASE ORAL
Status: COMPLETED | OUTPATIENT
Start: 2022-01-11 | End: 2022-01-12

## 2022-01-11 RX ORDER — SODIUM CHLORIDE, SODIUM LACTATE, POTASSIUM CHLORIDE, CALCIUM CHLORIDE 600; 310; 30; 20 MG/100ML; MG/100ML; MG/100ML; MG/100ML
75 INJECTION, SOLUTION INTRAVENOUS CONTINUOUS
Status: DISCONTINUED | OUTPATIENT
Start: 2022-01-11 | End: 2022-01-12

## 2022-01-11 RX ADMIN — ENOXAPARIN SODIUM 40 MG: 40 INJECTION SUBCUTANEOUS at 08:58

## 2022-01-11 RX ADMIN — POTASSIUM CHLORIDE 40 MEQ: 1500 TABLET, EXTENDED RELEASE ORAL at 08:58

## 2022-01-11 RX ADMIN — POTASSIUM CHLORIDE 40 MEQ: 1500 TABLET, EXTENDED RELEASE ORAL at 14:18

## 2022-01-11 RX ADMIN — SODIUM CHLORIDE, SODIUM LACTATE, POTASSIUM CHLORIDE, AND CALCIUM CHLORIDE 75 ML/HR: .6; .31; .03; .02 INJECTION, SOLUTION INTRAVENOUS at 12:01

## 2022-01-11 RX ADMIN — POTASSIUM CHLORIDE 40 MEQ: 1500 TABLET, EXTENDED RELEASE ORAL at 20:29

## 2022-01-11 RX ADMIN — ENOXAPARIN SODIUM 40 MG: 40 INJECTION SUBCUTANEOUS at 20:29

## 2022-01-11 RX ADMIN — FAMOTIDINE 20 MG: 20 TABLET ORAL at 20:29

## 2022-01-11 RX ADMIN — DEXAMETHASONE SODIUM PHOSPHATE 6 MG: 4 INJECTION, SOLUTION INTRA-ARTICULAR; INTRALESIONAL; INTRAMUSCULAR; INTRAVENOUS; SOFT TISSUE at 14:18

## 2022-01-11 RX ADMIN — FAMOTIDINE 20 MG: 20 TABLET ORAL at 08:58

## 2022-01-11 RX ADMIN — REMDESIVIR 100 MG: 100 INJECTION, POWDER, LYOPHILIZED, FOR SOLUTION INTRAVENOUS at 17:19

## 2022-01-11 NOTE — ASSESSMENT & PLAN NOTE
· Continue mid flow oxygen for now and will titrate as needed, needing 12-15 L of oxygen  · Continue isolation protocols  · Will continue IV remdesivir and IV Decadron  · Tylenol as needed for fevers  · Albuterol inhalers needed for fevers  · Patient on clear liquid diet and advancing as tolerated  · Continue symptomatic management    Blood pressure 108/57, pulse 87, temperature 97 8 °F (36 6 °C), temperature source Oral, resp  rate 22, height 5' 5" (1 651 m), weight 113 kg (250 lb), SpO2 95 %

## 2022-01-11 NOTE — UTILIZATION REVIEW
Inpatient Admission Authorization Request   NOTIFICATION OF INPATIENT ADMISSION/INPATIENT AUTHORIZATION REQUEST   SERVICING FACILITY:   [address] [place] [address]  Tax ID: [id number]  NPI: [id number]  Place of Service: [facility]patient [facility]  [address]  Place of Service Code: 24     ATTENDING PROVIDER:  Attending Name and NPI#: Flor Aguilar [[id number]]  Address: [address] [place] [address]  Phone: [phone]     UTILIZATION REVIEW CONTACT:  [facility] Utilization   Network Utilization Review Department  Phone: [phone]  Fax [phone]  Email: [email or web address]     PHYSICIAN ADVISORY SERVICES:  FOR LXDU-GO-DQTH REVIEW - MEDICAL NECESSITY DENIAL  Phone: [phone]  Fax: [phone]  Email: [email or web address]     TYPE OF REQUEST:  Inpatient Status     ADMISSION INFORMATION:  ADMISSION DATE/TIME: 1/10/22  3:02 PM  PATIENT DIAGNOSIS CODE/DESCRIPTION:  Low oxygen saturation [R79 81]  DISCHARGE DATE/TIME: No discharge date for patient encounter  DISCHARGE DISPOSITION (IF DISCHARGED): Home/Self Care     IMPORTANT INFORMATION:  Please contact the [facility] directly with any questions or concerns regarding this request  Department voicemails are confidential     Send requests for admission clinical reviews, concurrent reviews, approvals, and administrative denials due to lack of clinical to fax [phone]

## 2022-01-11 NOTE — UTILIZATION REVIEW
Initial Clinical Review    Admission: Date/Time/Statement:   Admission Orders (From admission, onward)     Ordered        01/10/22 1502  Inpatient Admission  Once                      Orders Placed This Encounter   Procedures    Inpatient Admission     Standing Status:   Standing     Number of Occurrences:   1     Order Specific Question:   Level of Care     Answer:   Med Surg [16]     Order Specific Question:   Estimated length of stay     Answer:   More than 2 Midnights     Order Specific Question:   Certification     Answer:   I certify that inpatient services are medically necessary for this patient for a duration of greater than two midnights  See H&P and MD Progress Notes for additional information about the patient's course of treatment  ED Arrival Information     Expected Arrival Acuity    - 1/10/2022 12:12 Emergent         Means of arrival Escorted by Service Admission type    [facility] Emergency         Arrival complaint    LOW OXYGEN         Chief Complaint   Patient presents with    Shortness of Breath     pt c/o sob, states he went to urgent care and was told to come here for low oxygen  Initial Presentation: 52 yo male to ED from home w/ SOB and progressively worsening over the past week   Tested + COVID on 12/26   Went to urgent care and found to be hypoxic and sent to ED   O2 sat 80 % on arrival on 10 l mid flow  Admitted IP status for acute hypoxemic resp failure d/t COVID plan for remdesivir , decadron , inhalers , titrate O2 as needed  Hyponatremia give IVF and recheck in am     K 2 7 repleted and recheck in am      Date: 1/11   Day 2: pt w/ significant SOB , present at rest and inc on exertion   + mild cough   On 12-15 l O2 , titrate as able  Cont iv remdesivir and iv decadron   clear liq adv as coleman    Hyponatremia improved , recheck labs in am       ED Triage Vitals   Temperature Pulse Respirations Blood Pressure SpO2   01/10/22 1228 01/10/22 1218 01/10/22 22 [id number] 01/10/22 1218 01/10/22 1218   99 5 °F (37 5 °C) (!) 106 (!) 26 146/82 (!) 80 %      Temp Source Heart Rate Source Patient Position - Orthostatic VS BP Location FiO2 (%)   01/10/22 1228 01/10/22 1218 01/10/22 1218 01/10/22 1218 --   Oral Monitor Sitting Left arm       Pain Score       01/10/22 1228       No Pain          Wt Readings from Last 1 Encounters:   01/10/22 113 kg (250 lb)     Additional Vital Signs:   01/11/22 0801 -- -- -- -- -- 94 % 72 -- 13 L/min Mid flow nasal cannula -- --   01/11/22 0700 -- 93 20 144/62 89 93 % 68 -- 12 L/min Mid flow nasal cannula -- --   01/11/22 0600 -- 82 -- 117/64 84 94 % -- -- -- -- -- --   01/11/22 0500 -- 81 -- 116/75 90 94 % 68 -- 12 L/min Mid flow nasal cannula -- Lying   01/11/22 0400 -- 87 -- 116/69 87 91 % 68 -- 12 L/min Mid flow nasal cannula -- --   01/11/22 0328 -- 87 20 -- -- 92 % -- -- -- -- MFNC prongs --   01/11/22 0300 -- 82 -- 112/66 81 95 % -- -- -- -- -- --   01/11/22 0200 -- 88 44 Abnormal  100/58 71 92 % 68 -- 12 L/min Mid flow nasal cannula -- Lying   01/11/22 0100 -- 84 43 Abnormal  110/67 82 96 % 68 -- 12 L/min Mid flow nasal cannula -- Lying   01/11/22 0002 -- -- -- -- -- -- -- 12 L/min -- Mid flow nasal cannula -- --   01/11/22 0000 97 8 °F (36 6 °C) 86 46 Abnormal  127/58 83 94 % 68 -- 12 L/min Mid flow nasal cannula -- Lying   01/10/22 2330 -- 86 39 Abnormal  119/61 82 94 % -- -- -- -- -- --   01/10/22 2300 -- 91 34 Abnormal  115/58 81 91 % 68 -- 12 L/min Mid flow nasal cannula -- Lying   01/10/22 2215 -- 87 58 Abnormal  -- -- 92 % 68 -- 12 L/min Mid flow nasal cannula -- Lying   01/10/22 2130 -- 90 46 Abnormal  119/64 84 92 % 68 -- 12 L/min Mid flow nasal cannula -- Lying   01/10/22 2100 -- 90 43 Abnormal  114/64 83 93 % 68 -- 12 L/min Mid flow nasal cannula -- --   01/10/22 2045 -- 92 45 Abnormal  -- -- 94 % -- -- -- -- -- --   01/10/22 2029 -- 92 30 Abnormal  -- -- 91 % -- -- -- -- MFNC prongs --   01/10/22 2015 -- 98 37 Abnormal  -- -- 92 % -- -- -- -- -- --   01/10/22 1930 97 5 °F (36 4 °C) 98 54 Abnormal  109/72 85 92 % -- -- -- Mid flow nasal cannula -- --   01/10/22 1715 -- 96 31 Abnormal  110/67 -- 93 % 60 -- 10 L/min Mid flow nasal cannula -- --   01/10/22 1500 -- 97 28 Abnormal  103/56 72 92 % 60 -- 10 L/min Mid flow nasal cannula -- --   01/10/22 1441 -- -- 32 Abnormal  -- -- 91 % -- -- -- Mid flow nasal cannula -- --   01/10/22 1301 -- -- -- -- -- 93 % 60 -- 10 L/min Mid flow nasal cannula -- --   01/10/22 1229 -- -- -- -- -- -- -- -- -- Nasal cannula -- --   01/10/22 1228 99 5 °F (37 5 °C) 101 28 Abnormal  111/72 -- 90 % 40            Pertinent Labs/Diagnostic Test Results:   1/10 CTA chest   No evidence for pulmonary embolism        Multifocal areas of consolidation throughout the lungs bilaterally consistent with Covid-19 pneumonia          1/10 EKG NSR  1/10 PCXR Diffuse significant infiltrates  Results from last 7 days   Lab Units 01/10/22  1245   SARS-COV-2  Positive*     Results from last 7 days   Lab Units 01/11/22  0449 01/10/22  1244   WBC Thousand/uL 8 25 11 01*   HEMOGLOBIN g/dL 15 0 15 6   HEMATOCRIT % 43 1 45 3   PLATELETS Thousands/uL 520* 499*     Results from last 7 days   Lab Units 01/11/22  0449 01/10/22  1244   SODIUM mmol/L 133* 130*   POTASSIUM mmol/L 3 3* 2 7*   CHLORIDE mmol/L 95* 90*   CO2 mmol/L 29 30   ANION GAP mmol/L 9 10   BUN mg/dL 16 24   CREATININE mg/dL 1 05 1 41*   EGFR ml/min/1 73sq m 84 58   CALCIUM mg/dL 8 9 8 9   MAGNESIUM mg/dL 2 4 2 6     Results from last 7 days   Lab Units 01/11/22  0449 01/10/22  1244   AST U/L 47* 58*   ALT U/L 73 72   ALK PHOS U/L 105 111   TOTAL PROTEIN g/dL 7 9 8 5*   ALBUMIN g/dL 2 4* 2 6*   TOTAL BILIRUBIN mg/dL 1 25* 1 90*     Results from last 7 days   Lab Units 01/11/22  0449 01/10/22  1244   GLUCOSE RANDOM mg/dL 194* 124     Results from last 7 days   Lab Units 01/10/22  1508 01/10/22  1244   HS TNI 0HR ng/L  --  17   HS TNI 2HR ng/L 10  --    HSTNI D2 ng/L -7  -- Results from last 7 days   Lab Units 01/10/22  1244   D-DIMER QUANTITATIVE ug/ml FEU 0 99*     Results from last 7 days   Lab Units 01/10/22  1244   PROTIME seconds 13 2   INR  1 04   PTT seconds 28     Results from last 7 days   Lab Units 01/10/22  1508 01/10/22  1244   LACTIC ACID mmol/L 1 5 2 1*     Results from last 7 days   Lab Units 01/10/22  1244   NT-PRO BNP pg/mL 46     Results from last 7 days   Lab Units 01/10/22  1531   CLARITY UA  Clear   COLOR UA  Yellow   SPEC GRAV UA  1 010   PH UA  6 5   GLUCOSE UA mg/dl Negative   KETONES UA mg/dl Negative   BLOOD UA  Small*   PROTEIN UA mg/dl 100 (2+)*   NITRITE UA  Negative   BILIRUBIN UA  Small*   UROBILINOGEN UA E U /dl 0 2   LEUKOCYTES UA  Negative   WBC UA /hpf 1-2   RBC UA /hpf 2-4   BACTERIA UA /hpf Occasional   EPITHELIAL CELLS WET PREP /hpf Occasional     Results from last 7 days   Lab Units 01/10/22  1245   INFLUENZA A PCR  Negative   INFLUENZA B PCR  Negative   RSV PCR  Negative     ED Treatment:   Medication Administration from 01/10/2022 1212 to 01/11/2022 0851       Date/Time Order Dose Route Action     01/10/2022 1247 sodium chloride 0 9 % infusion 125 mL/hr Intravenous New Bag     01/10/2022 1505 potassium chloride (K-DUR,KLOR-CON) CR tablet 40 mEq 40 mEq Oral Given     01/10/2022 2013 albuterol (PROVENTIL HFA,VENTOLIN HFA) inhaler 2 puff 2 puff Inhalation Given     01/10/2022 1633 dexamethasone (DECADRON) injection 6 mg 6 mg Intravenous Given     01/10/2022 1633 remdesivir (Veklury) 200 mg in sodium chloride 0 9 % 290 mL IVPB 200 mg Intravenous Given     01/10/2022 1729 famotidine (PEPCID) tablet 20 mg 20 mg Oral Given     01/10/2022 1729 sodium chloride 0 9 % with KCl 40 mEq/L infusion (premix) 100 mL/hr Intravenous New Bag     01/10/2022 1729 potassium chloride (K-DUR,KLOR-CON) CR tablet 40 mEq 40 mEq Oral Given     01/10/2022 2046 enoxaparin (LOVENOX) subcutaneous injection 40 mg 40 mg Subcutaneous Given     01/10/2022 2046 benzonatate (TESSALON PERLES) capsule 100 mg 100 mg Oral Given        No past medical history on file  Present on Admission:  **None**      Admitting Diagnosis: Low oxygen saturation [R79 81]  Age/Sex: 51 y o  male  Admission Orders:  Scheduled Medications:  dexamethasone, 6 mg, Intravenous, Q24H  enoxaparin, 40 mg, Subcutaneous, Q12H ROSS  famotidine, 20 mg, Oral, BID  potassium chloride, 40 mEq, Oral, BID  remdesivir, 100 mg, Intravenous, Q24H      Continuous IV Infusions:     PRN Meds:  acetaminophen, 650 mg, Oral, Q4H PRN  albuterol, 2 puff, Inhalation, Q4H PRN  benzonatate, 100 mg, Oral, TID PRN  ondansetron, 4 mg, Intravenous, Q4H PRN    Self proning   Keep o2 sat >90 %   Tele     Network Utilization Review Department  ATTENTION: Please call with any questions or concerns to [phone] and carefully listen to the prompts so that you are directed to the right person  All voicemails are confidential   Lindsay Kirby all requests for admission clinical reviews, approved or denied determinations and any other requests to dedicated fax number below belonging to the campus where the patient is receiving treatment   List of dedicated fax numbers for the Facilities:  [address] DENIALS (Administrative/Medical Necessity) [phone]   [address] (Maternity/NICU/Pediatrics) [phone]   [address]  [phone]   [address] [facility] [place] [address] [address] [place] [address] Charlene Jimenez [address] [place] [facility] [phone]

## 2022-01-11 NOTE — ASSESSMENT & PLAN NOTE
· Will hydrate with IV fluids   · Sodium improved  · ReCheck labs in a m      Results from last 7 days   Lab Units 01/11/22  0449 01/10/22  1244   SODIUM mmol/L 133* 130*   POTASSIUM mmol/L 3 3* 2 7*   CHLORIDE mmol/L 95* 90*   CO2 mmol/L 29 30   ANION GAP mmol/L 9 10   BUN mg/dL 16 24   CREATININE mg/dL 1 05 1 41*   CALCIUM mg/dL 8 9 8 9   ALBUMIN g/dL 2 4* 2 6*   TOTAL BILIRUBIN mg/dL 1 25* 1 90*   ALK PHOS U/L 105 111   ALT U/L 73 72   AST U/L 47* 58*   EGFR ml/min/1 73sq m 84 58   GLUCOSE RANDOM mg/dL 194* 124

## 2022-01-11 NOTE — ED NOTES
MD aware of increased O2 and persistent cough  Order for Tessilon pearls rec'chao and given       Abdirahman Osborne RN  01/10/22 2055

## 2022-01-11 NOTE — CASE MANAGEMENT
Case Management Assessment & Discharge Planning Note    Patient name Rad Barth  Location ED 14/ED 14 MRN [id number]  : 1975 Date 2022       Current Admission Date: 1/10/2022  Current Admission Diagnosis:Acute hypoxemic respiratory failure due to [address])   Patient Active Problem List    Diagnosis Date Noted    Acute hypoxemic respiratory failure due to [address]) 01/10/2022    Morbid obesity with BMI of 40 0-44 9, adult ([facility] [address] ) 01/10/2022    Hypokalemia 01/10/2022    Hyponatremia 01/10/2022      LOS (days): 1  Geometric Mean LOS (GMLOS) (days):   Days to GMLOS:     OBJECTIVE:    Risk of Unplanned Readmission Score: 9         Current admission status: Inpatient       Preferred Pharmacy: No Pharmacies Listed  Primary Care Provider: No primary care provider on file  Primary Insurance: BLUE CROSS  Secondary Insurance:     ASSESSMENT:  [address] [place] [address] Representative   Primary Phone: [phone] (Home)               Advance Directives  Does patient have a [address] [place] [address] Avenue?: No  Was patient offered paperwork?: Yes (not interested)  Does patient currently have a Health Care decision maker?: Yes, please see Health Care Proxy section  Does patient have Advance Directives?: No  Was patient offered paperwork?: Yes         Readmission Root Cause  30 Day Readmission: No    Patient Information  Admitted from[de-identified] Home  Mental Status: Alert  During Assessment patient was accompanied by: Not accompanied during assessment  Assessment information provided by[de-identified] Friend  Primary Caregiver: Self  Support Systems: Daughter  [place] of Residence: [place] [address] do you live in?: [facility] entry access options   Select all that apply : Stairs  Number of steps to enter home : 3  Do the steps have railings?: Yes  Type of Current Residence: [address] [place] home  Upon entering residence, is there a bedroom on the main floor (no further steps)?: No  A bedroom is located on the following floor levels of residence (select all that apply):: 2nd Floor  Upon entering residence, is there a bathroom on the main floor (no further steps)?: Yes  Number of steps to 2nd floor from main floor: One Flight  In the last 12 months, was there a time when you were not able to pay the mortgage or rent on time?: No  In the last 12 months, how many places have you lived?: 1  In the last 12 months, was there a time when you did not have a steady place to sleep or slept in a shelter (including now)?: No  Homeless/housing insecurity resource given?: N/A  Living Arrangements: Lives w/ Daughter  Is patient a ?: No    Activities of Daily Living Prior to Admission  Functional Status: Independent  Completes ADLs independently?: Yes  Ambulates independently?: Yes  Does patient use assisted devices?: No  Does patient currently own DME?: No  Does patient have a history of Outpatient Therapy (PT/OT)?: No  Does the patient have a history of Short-Term Rehab?: No  Does patient have a history of HHC?: No  Does patient currently have [facility]?: No         Patient Information Continued  Income Source: Employed  Does patient have prescription coverage?: Yes  Within the past 12 months, you worried that your food would run out before you got the money to buy more : Never true  Within the past 12 months, the food you bought just didnt last and you didnt have money to get more : Never true  Food insecurity resource given?: N/A  Does patient receive dialysis treatments?: No  Does patient have a history of substance abuse?: No  Does patient have a history of Mental Health Diagnosis?: No         Means of Transportation  Means of Transport to Appts[de-identified] Drives Self  In the past 12 months, has lack of transportation kept you from medical appointments or from getting medications?: No  In the past 12 months, has lack of transportation kept you from meetings, work, or from getting things needed for daily living?: No  Was application for public transport provided?: N/A        DISCHARGE DETAILS:    Discharge planning discussed with[de-identified] Friend Merary Robison  Freedom of Choice: Yes     CM contacted family/caregiver?: Yes     Did patient/caregiver verbalize understanding of patient care needs?: Yes  Were patient/caregiver advised of the risks associated with not following Treatment Team discharge recommendations?: Yes    Contacts  Patient Contacts:  Merary Robison  Relationship to Patient[de-identified] Friend  Contact Method: Phone  Phone Number: [phone] [address]         Is the patient interested in [place] [address] at discharge?: No    DME Referral Provided  Referral made for DME?: No                 Discharge Destination Plan[de-identified] Home  Transport at Discharge : [place]

## 2022-01-11 NOTE — ED NOTES
Patient states "This cough is driving me crazy"  Emotional support given       Aline Ervin, RN  01/10/22 1943

## 2022-01-11 NOTE — ASSESSMENT & PLAN NOTE
· Outpatient follow-up  · Would benefit from lifestyle modifications  · This is also a risk factor for sub optimal outcomes post COVID infection and therefore we need to closely monitor

## 2022-01-11 NOTE — PROGRESS NOTES
[facility]  Progress Note - Karen Xavier 1975, 51 y o  male MRN: [id number]  Unit/Bed#: ED 14 Encounter: [id number]  Primary Care Provider: No primary care provider on file  Date and time admitted to hospital: 1/10/2022 12:21 PM    Hyponatremia  Assessment & Plan  · Will hydrate with IV fluids   · Sodium improved  · ReCheck labs in a m  Results from last 7 days   Lab Units 01/11/22  0449 01/10/22  1244   SODIUM mmol/L 133* 130*   POTASSIUM mmol/L 3 3* 2 7*   CHLORIDE mmol/L 95* 90*   CO2 mmol/L 29 30   ANION GAP mmol/L 9 10   BUN mg/dL 16 24   CREATININE mg/dL 1 05 1 41*   CALCIUM mg/dL 8 9 8 9   ALBUMIN g/dL 2 4* 2 6*   TOTAL BILIRUBIN mg/dL 1 25* 1 90*   ALK PHOS U/L 105 111   ALT U/L 73 72   AST U/L 47* 58*   EGFR ml/min/1 73sq m 84 58   GLUCOSE RANDOM mg/dL 194* 124         Hypokalemia  Assessment & Plan  · Will order KCl oral 3 doses  · Recheck labs in a m  Morbid obesity with BMI of 40 0-44 9, adult ([facility] [address] )  Assessment & Plan  · Outpatient follow-up  · Would benefit from lifestyle modifications  · This is also a risk factor for sub optimal outcomes post COVID infection and therefore we need to closely monitor    * Acute hypoxemic respiratory failure due to COVID-19 [facility])  Assessment & Plan  · Continue mid flow oxygen for now and will titrate as needed, needing 12-15 L of oxygen  · Continue isolation protocols  · Will continue IV remdesivir and IV Decadron  · Tylenol as needed for fevers  · Albuterol inhalers needed for fevers  · Patient on clear liquid diet and advancing as tolerated  · Continue symptomatic management    Blood pressure 108/57, pulse 87, temperature 97 8 °F (36 6 °C), temperature source Oral, resp  rate 22, height 5' 5" (1 651 m), weight 113 kg (250 lb), SpO2 95 %  TE Pharmacologic Prophylaxis: Enoxaparin (Lovenox)    Patient Centered Rounds: I have performed bedside rounds with nursing staff today    Discussions with Specialists or Other Care Team Provider:  Care team  Education and Discussions with Family / Patient:  Patient    Current Length of Stay: 1 day(s)  Current Patient Status: Inpatient     Certification Statement: The patient will continue to require additional inpatient hospital stay due to Acute hypoxemic respiratory failure due to COVID-19 [facility])  Discharge Plan / Estimated Discharge Date:  3-4 days    Code Status: Level 1 - Full Code  ______________________________________________________________________________    Subjective:   Patient seen and examined by me  Patient is having significant shortness of breath which is present at rest and increases on exertion  He is advised [place]abelene [place] and states that he will try his best to do the same  No chest pain, palpitations or diaphoresis at this point of time  Patient does have mild cough but no expectoration  No high fever or chills over the last 24 hours    Objective:   Vitals: Blood pressure 108/57, pulse 87, temperature 97 8 °F (36 6 °C), temperature source Oral, resp  rate 22, height 5' 5" (1 651 m), weight 113 kg (250 lb), SpO2 95 %  Physical Exam:   General appearance: alert, appears stated age and cooperative  Constitutional- looks a little weak  HEENT - atraumatic and normocephalic  Neck- supple  Skin - no fresh rash  Extremities no fresh focal deformities  Cardiovascular- S1-S2 heard  Respiratory- bilateral air entry present, no crackles or rhonchi    No use of any accessory muscles of breathing  Skin - no fresh rash  Abdomen - normal bowel sounds present, no rebound tenderness  CNS- No fresh focal deficits  Psych- no acute psychosis     Additional Data:   Labs:  Results from last 7 days   Lab Units 01/11/22  0449 01/10/22  1244   WBC Thousand/uL 8 25 11 01*   HEMOGLOBIN g/dL 15 0 15 6   HEMATOCRIT % 43 1 45 3   MCV fL 92 90   TOTAL NEUT ABS Thousand/uL  --  8 59*   PLATELETS Thousands/uL 520* 499*   INR   --  1 04     Results from last 7 days   Lab Units 01/11/22  0449 01/10/22  1244 SODIUM mmol/L 133* 130*   POTASSIUM mmol/L 3 3* 2 7*   CHLORIDE mmol/L 95* 90*   CO2 mmol/L 29 30   ANION GAP mmol/L 9 10   BUN mg/dL 16 24   CREATININE mg/dL 1 05 1 41*   CALCIUM mg/dL 8 9 8 9   ALBUMIN g/dL 2 4* 2 6*   TOTAL BILIRUBIN mg/dL 1 25* 1 90*   ALK PHOS U/L 105 111   ALT U/L 73 72   AST U/L 47* 58*   EGFR ml/min/1 73sq m 84 58   GLUCOSE RANDOM mg/dL 194* 124     Results from last 7 days   Lab Units 01/11/22  0449 01/10/22  1244   MAGNESIUM mg/dL 2 4 2 6         Results from last 7 days   Lab Units 01/10/22  1244   NT-PRO BNP pg/mL 46      Results from last 7 days   Lab Units 01/10/22  1508 01/10/22  1244   LACTIC ACID mmol/L 1 5 2 1*                 * I Have Reviewed All Lab Data Listed Above  Cultures:   Results from last 7 days   Lab Units 01/10/22  1245   INFLUENZA A PCR  Negative     Results from last 7 days   Lab Units 01/10/22  1245   INFLUENZA A PCR  Negative   INFLUENZA B PCR  Negative   RSV PCR  Negative         Imaging:  Imaging Reports Reviewed Today Include:   XR chest 1 view portable    Result Date: 1/10/2022  Impression: Multilobar pneumonia  The right clinical setting, Covid pneumonia should be considered  Workstation performed: [id number]     CTA ED chest PE study    Result Date: 1/10/2022  Impression: No evidence for pulmonary embolism  Multifocal areas of consolidation throughout the lungs bilaterally consistent with Covid-19 pneumonia   Workstation performed: [id number]     Scheduled Meds:  Current Facility-Administered Medications   Medication Dose Route Frequency Provider Last Rate    acetaminophen  650 mg Oral Q4H PRN Logan Burris MD      albuterol  2 puff Inhalation Q4H PRN Logan Burris MD      benzonatate  100 mg Oral TID PRN Jamey Nix PA-C      dexamethasone  6 mg Intravenous Q24H Logan Burris MD      enoxaparin  40 mg Subcutaneous Q12H [address] Logan Burris MD      famotidine  20 mg Oral BID Logan Burris MD      lactated ringers  75 mL/hr Intravenous Continuous Tommie Holstein, MD 75 mL/hr (01/11/22 1201)    ondansetron  4 mg Intravenous Q4H PRN Jake Patiño MD      potassium chloride  40 mEq Oral TID With Meals Tommie Holstein, MD      remdesivir  100 mg Intravenous Q24H Lytle Rode, MD Tommie Holstein, MD [address] Internal [facility]    ** Please Note: This note has been constructed using a voice recognition system   **

## 2022-01-12 LAB
ANION GAP SERPL CALCULATED.3IONS-SCNC: 10 MMOL/L (ref 4–13)
ATRIAL RATE: 96 BPM
BUN SERPL-MCNC: 12 MG/DL (ref 5–25)
CALCIUM SERPL-MCNC: 9.3 MG/DL (ref 8.3–10.1)
CHLORIDE SERPL-SCNC: 100 MMOL/L (ref 100–108)
CO2 SERPL-SCNC: 27 MMOL/L (ref 21–32)
CREAT SERPL-MCNC: 0.97 MG/DL (ref 0.6–1.3)
ERYTHROCYTE [DISTWIDTH] IN BLOOD BY AUTOMATED COUNT: 13.2 % (ref 11.6–15.1)
GFR SERPL CREATININE-BSD FRML MDRD: 92 ML/MIN/1.73SQ M
GLUCOSE SERPL-MCNC: 156 MG/DL (ref 65–140)
HCT VFR BLD AUTO: 42 % (ref 36.5–49.3)
HGB BLD-MCNC: 14.7 G/DL (ref 12–17)
MCH RBC QN AUTO: 32.1 PG (ref 26.8–34.3)
MCHC RBC AUTO-ENTMCNC: 35 G/DL (ref 31.4–37.4)
MCV RBC AUTO: 92 FL (ref 82–98)
P AXIS: 62 DEGREES
PLATELET # BLD AUTO: 617 THOUSANDS/UL (ref 149–390)
PMV BLD AUTO: 9.6 FL (ref 8.9–12.7)
POTASSIUM SERPL-SCNC: 3.4 MMOL/L (ref 3.5–5.3)
PR INTERVAL: 150 MS
QRS AXIS: -6 DEGREES
QRSD INTERVAL: 94 MS
QT INTERVAL: 350 MS
QTC INTERVAL: 442 MS
RBC # BLD AUTO: 4.58 MILLION/UL (ref 3.88–5.62)
SODIUM SERPL-SCNC: 137 MMOL/L (ref 136–145)
T WAVE AXIS: 41 DEGREES
VENTRICULAR RATE: 96 BPM
WBC # BLD AUTO: 11.08 THOUSAND/UL (ref 4.31–10.16)

## 2022-01-12 PROCEDURE — 94760 N-INVAS EAR/PLS OXIMETRY 1: CPT

## 2022-01-12 PROCEDURE — 99233 SBSQ HOSP IP/OBS HIGH 50: CPT | Performed by: INTERNAL MEDICINE

## 2022-01-12 PROCEDURE — 93010 ELECTROCARDIOGRAM REPORT: CPT | Performed by: INTERNAL MEDICINE

## 2022-01-12 PROCEDURE — 85027 COMPLETE CBC AUTOMATED: CPT | Performed by: INTERNAL MEDICINE

## 2022-01-12 PROCEDURE — 80048 BASIC METABOLIC PNL TOTAL CA: CPT | Performed by: INTERNAL MEDICINE

## 2022-01-12 RX ORDER — POTASSIUM CHLORIDE 20 MEQ/1
40 TABLET, EXTENDED RELEASE ORAL
Status: COMPLETED | OUTPATIENT
Start: 2022-01-12 | End: 2022-01-13

## 2022-01-12 RX ADMIN — POTASSIUM CHLORIDE 40 MEQ: 1500 TABLET, EXTENDED RELEASE ORAL at 19:02

## 2022-01-12 RX ADMIN — FAMOTIDINE 20 MG: 20 TABLET ORAL at 11:01

## 2022-01-12 RX ADMIN — POTASSIUM CHLORIDE 40 MEQ: 1500 TABLET, EXTENDED RELEASE ORAL at 11:01

## 2022-01-12 RX ADMIN — ENOXAPARIN SODIUM 40 MG: 40 INJECTION SUBCUTANEOUS at 11:01

## 2022-01-12 RX ADMIN — ENOXAPARIN SODIUM 40 MG: 40 INJECTION SUBCUTANEOUS at 21:51

## 2022-01-12 RX ADMIN — DEXAMETHASONE SODIUM PHOSPHATE 6 MG: 4 INJECTION, SOLUTION INTRA-ARTICULAR; INTRALESIONAL; INTRAMUSCULAR; INTRAVENOUS; SOFT TISSUE at 19:02

## 2022-01-12 RX ADMIN — REMDESIVIR 100 MG: 100 INJECTION, POWDER, LYOPHILIZED, FOR SOLUTION INTRAVENOUS at 19:02

## 2022-01-12 NOTE — PLAN OF CARE
Problem: SAFETY ADULT  Goal: Patient will remain free of falls  Description: INTERVENTIONS:  - Educate patient/family on patient safety including physical limitations  - Instruct patient to call for assistance with activity   - Consult OT/PT to assist with strengthening/mobility   - Keep Call bell within reach  - Keep bed low and locked with side rails adjusted as appropriate  - Keep care items and personal belongings within reach  - Initiate and maintain comfort rounds  - Make Fall Risk Sign visible to staff  - Obtain necessary fall risk management equipment:   - Apply yellow socks and bracelet for high fall risk patients  - Consider moving patient to room near nurses station  Outcome: Progressing

## 2022-01-12 NOTE — PROGRESS NOTES
[facility]  Progress Note - Felicia Chino 1975, 51 y o  male MRN: [id number]  Unit/Bed#: ENDO 02-01 Encounter: [id number]  Primary Care Provider: No primary care provider on file  Date and time admitted to hospital: 1/10/2022 12:21 PM    Hyponatremia  Assessment & Plan  · Will hydrate with IV fluids   · Sodium improved  · ReCheck labs in a m  Results from last 7 days   Lab Units 01/12/22  0500 01/11/22  0449 01/10/22  1244   SODIUM mmol/L 137 133* 130*   POTASSIUM mmol/L 3 4* 3 3* 2 7*   CHLORIDE mmol/L 100 95* 90*   CO2 mmol/L 27 29 30   ANION GAP mmol/L 10 9 10   BUN mg/dL 12 16 24   CREATININE mg/dL 0 97 1 05 1 41*   CALCIUM mg/dL 9 3 8 9 8 9   ALBUMIN g/dL  --  2 4* 2 6*   TOTAL BILIRUBIN mg/dL  --  1 25* 1 90*   ALK PHOS U/L  --  105 111   ALT U/L  --  73 72   AST U/L  --  47* 58*   EGFR ml/min/1 73sq m 92 84 58   GLUCOSE RANDOM mg/dL 156* 194* 124     Stable      Hypokalemia  Assessment & Plan  · Will order KCl oral replacement  · Recheck labs in a m  Results from last 7 days   Lab Units 01/12/22  0500 01/11/22  0449 01/10/22  1244   SODIUM mmol/L 137 133* 130*   POTASSIUM mmol/L 3 4* 3 3* 2 7*   CHLORIDE mmol/L 100 95* 90*   CO2 mmol/L 27 29 30   ANION GAP mmol/L 10 9 10   BUN mg/dL 12 16 24   CREATININE mg/dL 0 97 1 05 1 41*   CALCIUM mg/dL 9 3 8 9 8 9   ALBUMIN g/dL  --  2 4* 2 6*   TOTAL BILIRUBIN mg/dL  --  1 25* 1 90*   ALK PHOS U/L  --  105 111   ALT U/L  --  73 72   AST U/L  --  47* 58*   EGFR ml/min/1 73sq m 92 84 58   GLUCOSE RANDOM mg/dL 156* 194* 124         Morbid obesity with BMI of 40 0-44 9, adult (HCC)  Assessment & Plan  · Outpatient follow-up    · Would benefit from lifestyle modifications  · This is also a risk factor for sub optimal outcomes post COVID infection and therefore we need to closely monitor    * Acute hypoxemic respiratory failure due to COVID-19 [facility])  Assessment & Plan  · Continue mid flow oxygen for now and will titrate as needed, needing 8-10 L of oxygen which is down from 12-15 L of oxygen  · Continue isolation protocols  · Will continue IV remdesivir and IV Decadron  · Tylenol as needed for fevers  · Albuterol inhalers needed for fevers  · Patient on clear liquid diet and advancing as tolerated  · Continue symptomatic management    Blood pressure 127/84, pulse 88, temperature 97 6 °F (36 4 °C), temperature source Temporal, resp  rate 19, height 5' 5" (1 651 m), weight 113 kg (250 lb), SpO2 96 %  TE Pharmacologic Prophylaxis: Enoxaparin (Lovenox)    Patient Centered Rounds: I have performed bedside rounds with nursing staff today  Discussions with Specialists or Other Care Team Provider:  Care team  Education and Discussions with Family / Patient:  Patient    Current Length of Stay: 2 day(s)  Current Patient Status: Inpatient     Certification Statement: The patient will continue to require additional inpatient hospital stay due to Acute hypoxemic respiratory failure due to COVID-19 [facility])  Discharge Plan / Estimated Discharge Date:  2-3 days    Code Status: Level 1 - Full Code  ______________________________________________________________________________    Subjective:   Patient seen and examined by me  Patient continues to have significant shortness of breath  Shortness of breath present at rest, increases on exertion but overall better than yesterday  No cough, fever or chills at this point of time  Does have generalized weakness    Objective:   Vitals: Blood pressure 127/84, pulse 88, temperature 97 6 °F (36 4 °C), temperature source Temporal, resp  rate 19, height 5' 5" (1 651 m), weight 113 kg (250 lb), SpO2 96 %      Physical Exam:   General appearance: alert, appears stated age and cooperative  Constitutional- looks a little weak  HEENT - atraumatic and normocephalic  Neck- supple  Skin - no fresh rash  Extremities no fresh focal deformities  Cardiovascular- S1-S2 heard  Respiratory- bilateral air entry present, no crackles or rhonchi  Skin - no fresh rash  Abdomen - normal bowel sounds present, no rebound tenderness  CNS- No fresh focal deficits  Psych- no acute psychosis     Additional Data:   Labs:  Results from last 7 days   Lab Units 01/12/22  0500 01/11/22  0449 01/10/22  1244   WBC Thousand/uL 11 08* 8 25 11 01*   HEMOGLOBIN g/dL 14 7 15 0 15 6   HEMATOCRIT % 42 0 43 1 45 3   MCV fL 92 92 90   TOTAL NEUT ABS Thousand/uL  --   --  8 59*   PLATELETS Thousands/uL 617* 520* 499*   INR   --   --  1 04     Results from last 7 days   Lab Units 01/12/22  0500 01/11/22  0449 01/10/22  1244   SODIUM mmol/L 137 133* 130*   POTASSIUM mmol/L 3 4* 3 3* 2 7*   CHLORIDE mmol/L 100 95* 90*   CO2 mmol/L 27 29 30   ANION GAP mmol/L 10 9 10   BUN mg/dL 12 16 24   CREATININE mg/dL 0 97 1 05 1 41*   CALCIUM mg/dL 9 3 8 9 8 9   ALBUMIN g/dL  --  2 4* 2 6*   TOTAL BILIRUBIN mg/dL  --  1 25* 1 90*   ALK PHOS U/L  --  105 111   ALT U/L  --  73 72   AST U/L  --  47* 58*   EGFR ml/min/1 73sq m 92 84 58   GLUCOSE RANDOM mg/dL 156* 194* 124     Results from last 7 days   Lab Units 01/11/22  0449 01/10/22  1244   MAGNESIUM mg/dL 2 4 2 6         Results from last 7 days   Lab Units 01/10/22  1244   NT-PRO BNP pg/mL 46      Results from last 7 days   Lab Units 01/10/22  1508 01/10/22  1244   LACTIC ACID mmol/L 1 5 2 1*                 * I Have Reviewed All Lab Data Listed Above  Cultures:   Results from last 7 days   Lab Units 01/10/22  1245   INFLUENZA A PCR  Negative     Results from last 7 days   Lab Units 01/10/22  1245   INFLUENZA A PCR  Negative   INFLUENZA B PCR  Negative   RSV PCR  Negative         Imaging:  Imaging Reports Reviewed Today Include:   XR chest 1 view portable    Result Date: 1/10/2022  Impression: Multilobar pneumonia  The right clinical setting, Covid pneumonia should be considered  Workstation performed: [id number]     CTA ED chest PE study    Result Date: 1/10/2022  Impression: No evidence for pulmonary embolism   Multifocal areas of consolidation throughout the lungs bilaterally consistent with Covid-19 pneumonia  Workstation performed: [id number]     Scheduled Meds:  Current Facility-Administered Medications   Medication Dose Route Frequency Provider Last Rate    acetaminophen  650 mg Oral Q4H PRN Michelle Dawn MD      albuterol  2 puff Inhalation Q4H PRN Michelle Dawn MD      benzonatate  100 mg Oral TID PRN Kelly Dee PA-C      dexamethasone  6 mg Intravenous Q24H Michelle Dawn MD      enoxaparin  40 mg Subcutaneous Q12H [address] Michelle Dawn MD      famotidine  20 mg Oral BID Michelle Dawn MD      ondansetron  4 mg Intravenous Q4H PRN Michelle Dawn MD      remdesivir  100 mg Intravenous Q24H MD Concepcion Gamez MD  [facility] Internal Medicine    ** Please Note: This note has been constructed using a voice recognition system   **

## 2022-01-12 NOTE — ASSESSMENT & PLAN NOTE
· Will hydrate with IV fluids   · Sodium improved  · ReCheck labs in a m      Results from last 7 days   Lab Units 01/12/22  0500 01/11/22  0449 01/10/22  1244   SODIUM mmol/L 137 133* 130*   POTASSIUM mmol/L 3 4* 3 3* 2 7*   CHLORIDE mmol/L 100 95* 90*   CO2 mmol/L 27 29 30   ANION GAP mmol/L 10 9 10   BUN mg/dL 12 16 24   CREATININE mg/dL 0 97 1 05 1 41*   CALCIUM mg/dL 9 3 8 9 8 9   ALBUMIN g/dL  --  2 4* 2 6*   TOTAL BILIRUBIN mg/dL  --  1 25* 1 90*   ALK PHOS U/L  --  105 111   ALT U/L  --  73 72   AST U/L  --  47* 58*   EGFR ml/min/1 73sq m 92 84 58   GLUCOSE RANDOM mg/dL 156* 194* 124     Stable

## 2022-01-12 NOTE — ASSESSMENT & PLAN NOTE
· Continue mid flow oxygen for now and will titrate as needed, needing 8-10 L of oxygen which is down from 12-15 L of oxygen  · Continue isolation protocols  · Will continue IV remdesivir and IV Decadron  · Tylenol as needed for fevers  · Albuterol inhalers needed for fevers  · Patient on clear liquid diet and advancing as tolerated  · Continue symptomatic management    Blood pressure 127/84, pulse 88, temperature 97 6 °F (36 4 °C), temperature source Temporal, resp  rate 19, height 5' 5" (1 651 m), weight 113 kg (250 lb), SpO2 96 %

## 2022-01-12 NOTE — ASSESSMENT & PLAN NOTE
· Will order KCl oral replacement  · Recheck labs in a m      Results from last 7 days   Lab Units 01/12/22  0500 01/11/22  0449 01/10/22  1244   SODIUM mmol/L 137 133* 130*   POTASSIUM mmol/L 3 4* 3 3* 2 7*   CHLORIDE mmol/L 100 95* 90*   CO2 mmol/L 27 29 30   ANION GAP mmol/L 10 9 10   BUN mg/dL 12 16 24   CREATININE mg/dL 0 97 1 05 1 41*   CALCIUM mg/dL 9 3 8 9 8 9   ALBUMIN g/dL  --  2 4* 2 6*   TOTAL BILIRUBIN mg/dL  --  1 25* 1 90*   ALK PHOS U/L  --  105 111   ALT U/L  --  73 72   AST U/L  --  47* 58*   EGFR ml/min/1 73sq m 92 84 58   GLUCOSE RANDOM mg/dL 156* 194* 124

## 2022-01-13 PROBLEM — E87.1 HYPONATREMIA: Status: RESOLVED | Noted: 2022-01-10 | Resolved: 2022-01-13

## 2022-01-13 PROCEDURE — XW0DXM6 INTRODUCTION OF BARICITINIB INTO MOUTH AND PHARYNX, EXTERNAL APPROACH, NEW TECHNOLOGY GROUP 6: ICD-10-PCS | Performed by: INTERNAL MEDICINE

## 2022-01-13 PROCEDURE — 99232 SBSQ HOSP IP/OBS MODERATE 35: CPT | Performed by: PHYSICIAN ASSISTANT

## 2022-01-13 RX ADMIN — BARICITINIB 4 MG: 2 TABLET, FILM COATED ORAL at 11:25

## 2022-01-13 RX ADMIN — FAMOTIDINE 20 MG: 20 TABLET ORAL at 08:51

## 2022-01-13 RX ADMIN — DEXAMETHASONE SODIUM PHOSPHATE 6 MG: 4 INJECTION, SOLUTION INTRA-ARTICULAR; INTRALESIONAL; INTRAMUSCULAR; INTRAVENOUS; SOFT TISSUE at 16:25

## 2022-01-13 RX ADMIN — ENOXAPARIN SODIUM 40 MG: 40 INJECTION SUBCUTANEOUS at 08:51

## 2022-01-13 RX ADMIN — REMDESIVIR 100 MG: 100 INJECTION, POWDER, LYOPHILIZED, FOR SOLUTION INTRAVENOUS at 16:26

## 2022-01-13 RX ADMIN — POTASSIUM CHLORIDE 40 MEQ: 1500 TABLET, EXTENDED RELEASE ORAL at 08:51

## 2022-01-13 RX ADMIN — FAMOTIDINE 20 MG: 20 TABLET ORAL at 18:35

## 2022-01-13 NOTE — CASE MANAGEMENT
Case Management Progress Note    Patient name Marilee Avery  Location ENDO 02/ENDO 02- MRN [id number]  : 1975 Date 2022       LOS (days): 2  Geometric Mean LOS (GMLOS) (days):   Days to 85 [facility]:        OBJECTIVE:        Current admission status: Inpatient  Preferred Pharmacy:   [address]  [place] [address] [address] [facility] [address]  [address] [place] [address] [address]  Phone: [phone] Fax: [phone]    Primary Care Provider: No primary care provider on file  Primary Insurance: BLUE CROSS  Secondary Insurance:     PROGRESS NOTE:    patient will continue to require additional inpatient hospital stay 48-72 hours due to Acute hypoxemic respiratory failure due to COVID-19

## 2022-01-13 NOTE — PLAN OF CARE
Problem: Potential for Falls  Goal: Patient will remain free of falls  Description: INTERVENTIONS:  - Educate patient/family on patient safety including physical limitations  - Instruct patient to call for assistance with activity   - Consult OT/PT to assist with strengthening/mobility   - Keep Call bell within reach  - Keep bed low and locked with side rails adjusted as appropriate  - Keep care items and personal belongings within reach  - Initiate and maintain comfort rounds  - Make Fall Risk Sign visible to staff  - Offer Toileting every  Hours, in advance of need  - Initiate/Maintain alarm  - Obtain necessary fall risk management equipment:   - Apply yellow socks and bracelet for high fall risk patients  - Consider moving patient to room near nurses station  Outcome: Progressing     Problem: INFECTION - ADULT  Goal: Absence or prevention of progression during hospitalization  Description: INTERVENTIONS:  - Assess and monitor for signs and symptoms of infection  - Monitor lab/diagnostic results  - Monitor all insertion sites, i e  indwelling lines, tubes, and drains  - Monitor endotracheal if appropriate and nasal secretions for changes in amount and color  - [place] appropriate cooling/warming therapies per order  - Administer medications as ordered  - Instruct and encourage patient and family to use good hand hygiene technique  - Identify and instruct in appropriate isolation precautions for identified infection/condition  Outcome: Progressing  Goal: Absence of fever/infection during neutropenic period  Description: INTERVENTIONS:  - Monitor WBC    Outcome: Progressing     Problem: SAFETY ADULT  Goal: Patient will remain free of falls  Description: INTERVENTIONS:  - Educate patient/family on patient safety including physical limitations  - Instruct patient to call for assistance with activity   - Consult OT/PT to assist with strengthening/mobility   - Keep Call bell within reach  - Keep bed low and locked with side rails adjusted as appropriate  - Keep care items and personal belongings within reach  - Initiate and maintain comfort rounds  - Make Fall Risk Sign visible to staff  - Offer Toileting every  Hours, in advance of need  - Initiate/Maintain alarm  - Obtain necessary fall risk management equipment:   - Apply yellow socks and bracelet for high fall risk patients  - Consider moving patient to room near nurses station  Outcome: Progressing  Goal: Maintain or return to baseline ADL function  Description: INTERVENTIONS:  -  Assess patient's ability to carry out ADLs; assess patient's baseline for ADL function and identify physical deficits which impact ability to perform ADLs (bathing, care of mouth/teeth, toileting, grooming, dressing, etc )  - Assess/evaluate cause of self-care deficits   - Assess range of motion  - Assess patient's mobility; develop plan if impaired  - Assess patient's need for assistive devices and provide as appropriate  - Encourage maximum independence but intervene and supervise when necessary  - Involve family in performance of ADLs  - Assess for home care needs following discharge   - Consider OT consult to assist with ADL evaluation and planning for discharge  - Provide patient education as appropriate  Outcome: Progressing  Goal: Maintains/Returns to pre admission functional level  Description: INTERVENTIONS:  - Perform BMAT or MOVE assessment daily    - Set and communicate daily mobility goal to care team and patient/family/caregiver  - Collaborate with rehabilitation services on mobility goals if consulted  - Perform Range of Motion  times a day  - Reposition patient every  hours    - Dangle patient  times a day  - Stand patient  times a day  - Ambulate patient  times a day  - Out of bed to chair  times a day   - Out of bed for meals  times a day  - Out of bed for toileting  - Record patient progress and toleration of activity level   Outcome: Progressing     Problem: DISCHARGE PLANNING  Goal: Discharge to home or other facility with appropriate resources  Description: INTERVENTIONS:  - Identify barriers to discharge w/patient and caregiver  - Arrange for needed discharge resources and transportation as appropriate  - Identify discharge learning needs (meds, wound care, etc )  - Arrange for interpretive services to assist at discharge as needed  - Refer to Case Management Department for coordinating discharge planning if the patient needs post-hospital services based on physician/advanced practitioner order or complex needs related to functional status, cognitive ability, or social support system  Outcome: Progressing     Problem: Knowledge Deficit  Goal: Patient/family/caregiver demonstrates understanding of disease process, treatment plan, medications, and discharge instructions  Description: Complete learning assessment and assess knowledge base  Interventions:  - Provide teaching at level of understanding  - Provide teaching via preferred learning methods  Outcome: Progressing     Problem: RESPIRATORY - ADULT  Goal: Achieves optimal ventilation and oxygenation  Description: INTERVENTIONS:  - Assess for changes in respiratory status  - Assess for changes in mentation and behavior  - Position to facilitate oxygenation and minimize respiratory effort  - Oxygen administered by appropriate delivery if ordered  - Initiate smoking cessation education as indicated  - Encourage broncho-pulmonary hygiene including cough, deep breathe, Incentive Spirometry  - Assess the need for suctioning and aspirate as needed  - Assess and instruct to report SOB or any respiratory difficulty  - Respiratory Therapy support as indicated  Outcome: Progressing     Problem: Nutrition/Hydration-ADULT  Goal: Nutrient/Hydration intake appropriate for improving, restoring or maintaining nutritional needs  Description: Monitor and assess patient's nutrition/hydration status for malnutrition   Collaborate with interdisciplinary team and initiate plan and interventions as ordered  Monitor patient's weight and dietary intake as ordered or per policy  Utilize nutrition screening tool and intervene as necessary  Determine patient's food preferences and provide high-protein, high-caloric foods as appropriate       INTERVENTIONS:  - Monitor oral intake, urinary output, labs, and treatment plans  - Assess nutrition and hydration status and recommend course of action  - Evaluate amount of meals eaten  - Assist patient with eating if necessary   - Allow adequate time for meals  - Recommend/ encourage appropriate diets, oral nutritional supplements, and vitamin/mineral supplements  - Order, calculate, and assess calorie counts as needed  - Recommend, monitor, and adjust tube feedings and TPN/PPN based on assessed needs  - Assess need for intravenous fluids  - Provide specific nutrition/hydration education as appropriate  - Include patient/family/caregiver in decisions related to nutrition  Outcome: Progressing

## 2022-01-13 NOTE — CASE MANAGEMENT
Case Management Discharge Planning Note    Patient name Ede Marquez ENDO 02/ENDO 02- MRN [id number]  : 1975 Date 2022       Current Admission Date: 1/10/2022  Current Admission Diagnosis:Acute hypoxemic respiratory failure due to [address])   Patient Active Problem List    Diagnosis Date Noted    Acute hypoxemic respiratory failure due to [address]) 01/10/2022    Morbid obesity with BMI of 40 0-44 9, adult ([address] ) 01/10/2022    Hypokalemia 01/10/2022      LOS (days): 3  Geometric Mean LOS (GMLOS) (days):   Days to GMLOS:     OBJECTIVE:  Risk of Unplanned Readmission Score: 9         Current admission status: Inpatient   Preferred Pharmacy:   [facility] [address]  [address]  [facility]  [address]  Phone: [phone] Fax: [phone]    Primary Care Provider: No primary care provider on file  Primary Insurance: BLUE CROSS  Secondary Insurance:     DISCHARGE DETAILS:    Discharge planning discussed with[de-identified] antic d c 48 hours   follow for pending home oxygen

## 2022-01-13 NOTE — ASSESSMENT & PLAN NOTE
· Patient presented with shortness of breath and hypoxia, known COVID positive initially 12/26  · Received COVID vaccines on 11/26 and 12/17 (moderna)  · The patient was up to 15 liters of mid flow, now weaned to 4 liters NC  · CTA chest neg for PE, D-dimer <2   · Continue IV Decadron 5/10  · IV remdesivir completed on 01/14  · Continue Baricitinib 2/14  · Encouraged IS and Pronation    · Albuterol inhalers PRN   · Continue symptomatic management

## 2022-01-13 NOTE — ASSESSMENT & PLAN NOTE
· Patient presented with shortness of breath and hypoxia, known COVID positive initially 12/26  · Received COVID vaccines on 11/26 and 12/17 (moderna)  · The patient was up to 15 liters of mid flow, now weaned to 6 liters NC  · CTA chest neg for PE, D-dimer <2   · Continue IV Decadron, remdesivir  · Start Baricitinib   · Encouraged IS and Pronation   · Albuterol inhalers PRN   · Continue symptomatic management

## 2022-01-13 NOTE — PLAN OF CARE
Problem: Potential for Falls  Goal: Patient will remain free of falls  Description: INTERVENTIONS:  - Educate patient/family on patient safety including physical limitations  - Instruct patient to call for assistance with activity   - Consult OT/PT to assist with strengthening/mobility   - Keep Call bell within reach  - Keep bed low and locked with side rails adjusted as appropriate  - Keep care items and personal belongings within reach  - Initiate and maintain comfort rounds  - Make Fall Risk Sign visible to staff  - Offer Toileting every  Hours, in advance of need  - Initiate/Maintain alarm  - Obtain necessary fall risk management equipment:   - Apply yellow socks and bracelet for high fall risk patients  - Consider moving patient to room near nurses station  Outcome: Progressing     Problem: INFECTION - ADULT  Goal: Absence or prevention of progression during hospitalization  Description: INTERVENTIONS:  - Assess and monitor for signs and symptoms of infection  - Monitor lab/diagnostic results  - Monitor all insertion sites, i e  indwelling lines, tubes, and drains  - Monitor endotracheal if appropriate and nasal secretions for changes in amount and color  - [place] appropriate cooling/warming therapies per order  - Administer medications as ordered  - Instruct and encourage patient and family to use good hand hygiene technique  - Identify and instruct in appropriate isolation precautions for identified infection/condition  Outcome: Progressing  Goal: Absence of fever/infection during neutropenic period  Description: INTERVENTIONS:  - Monitor WBC    Outcome: Progressing     Problem: SAFETY ADULT  Goal: Patient will remain free of falls  Description: INTERVENTIONS:  - Educate patient/family on patient safety including physical limitations  - Instruct patient to call for assistance with activity   - Consult OT/PT to assist with strengthening/mobility   - Keep Call bell within reach  - Keep bed low and locked with side rails adjusted as appropriate  - Keep care items and personal belongings within reach  - Initiate and maintain comfort rounds  - Make Fall Risk Sign visible to staff  - Offer Toileting every  Hours, in advance of need  - Initiate/Maintain alarm  - Obtain necessary fall risk management equipment:   - Apply yellow socks and bracelet for high fall risk patients  - Consider moving patient to room near nurses station  Outcome: Progressing  Goal: Maintain or return to baseline ADL function  Description: INTERVENTIONS:  -  Assess patient's ability to carry out ADLs; assess patient's baseline for ADL function and identify physical deficits which impact ability to perform ADLs (bathing, care of mouth/teeth, toileting, grooming, dressing, etc )  - Assess/evaluate cause of self-care deficits   - Assess range of motion  - Assess patient's mobility; develop plan if impaired  - Assess patient's need for assistive devices and provide as appropriate  - Encourage maximum independence but intervene and supervise when necessary  - Involve family in performance of ADLs  - Assess for home care needs following discharge   - Consider OT consult to assist with ADL evaluation and planning for discharge  - Provide patient education as appropriate  Outcome: Progressing  Goal: Maintains/Returns to pre admission functional level  Description: INTERVENTIONS:  - Perform BMAT or MOVE assessment daily    - Set and communicate daily mobility goal to care team and patient/family/caregiver  - Collaborate with rehabilitation services on mobility goals if consulted  - Perform Range of Motion  times a day  - Reposition patient every hours    - Dangle patient  times a day  - Stand patient  times a day  - Ambulate patient  times a day  - Out of bed to chair times a day   - Out of bed for meals  times a day  - Out of bed for toileting  - Record patient progress and toleration of activity level   Outcome: Progressing     Problem: DISCHARGE PLANNING  Goal: Discharge to home or other facility with appropriate resources  Description: INTERVENTIONS:  - Identify barriers to discharge w/patient and caregiver  - Arrange for needed discharge resources and transportation as appropriate  - Identify discharge learning needs (meds, wound care, etc )  - Arrange for interpretive services to assist at discharge as needed  - Refer to Case Management Department for coordinating discharge planning if the patient needs post-hospital services based on physician/advanced practitioner order or complex needs related to functional status, cognitive ability, or social support system  Outcome: Progressing     Problem: Knowledge Deficit  Goal: Patient/family/caregiver demonstrates understanding of disease process, treatment plan, medications, and discharge instructions  Description: Complete learning assessment and assess knowledge base  Interventions:  - Provide teaching at level of understanding  - Provide teaching via preferred learning methods  Outcome: Progressing     Problem: RESPIRATORY - ADULT  Goal: Achieves optimal ventilation and oxygenation  Description: INTERVENTIONS:  - Assess for changes in respiratory status  - Assess for changes in mentation and behavior  - Position to facilitate oxygenation and minimize respiratory effort  - Oxygen administered by appropriate delivery if ordered  - Initiate smoking cessation education as indicated  - Encourage broncho-pulmonary hygiene including cough, deep breathe, Incentive Spirometry  - Assess the need for suctioning and aspirate as needed  - Assess and instruct to report SOB or any respiratory difficulty  - Respiratory Therapy support as indicated  Outcome: Progressing     Problem: Nutrition/Hydration-ADULT  Goal: Nutrient/Hydration intake appropriate for improving, restoring or maintaining nutritional needs  Description: Monitor and assess patient's nutrition/hydration status for malnutrition   Collaborate with interdisciplinary team and initiate plan and interventions as ordered  Monitor patient's weight and dietary intake as ordered or per policy  Utilize nutrition screening tool and intervene as necessary  Determine patient's food preferences and provide high-protein, high-caloric foods as appropriate       INTERVENTIONS:  - Monitor oral intake, urinary output, labs, and treatment plans  - Assess nutrition and hydration status and recommend course of action  - Evaluate amount of meals eaten  - Assist patient with eating if necessary   - Allow adequate time for meals  - Recommend/ encourage appropriate diets, oral nutritional supplements, and vitamin/mineral supplements  - Order, calculate, and assess calorie counts as needed  - Recommend, monitor, and adjust tube feedings and TPN/PPN based on assessed needs  - Assess need for intravenous fluids  - Provide specific nutrition/hydration education as appropriate  - Include patient/family/caregiver in decisions related to nutrition  Outcome: Progressing

## 2022-01-13 NOTE — PROGRESS NOTES
[address] [place] [address]  Progress Note - Keri Sink 1975, 51 y o  male MRN: [id number]  Unit/Bed#: ENDO 02-01 Encounter: [id number]  Primary Care Provider: No primary care provider on file  Date and time admitted to hospital: 1/10/2022 12:21 PM    * Acute hypoxemic respiratory failure due to COVID-19 [facility])  Assessment & Plan  · Patient presented with shortness of breath and hypoxia, known COVID positive initially 12/26  · Received COVID vaccines on 11/26 and 12/17 (moderna)  · The patient was up to 15 liters of mid flow, now weaned to 6 liters NC  · CTA chest neg for PE, D-dimer <2   · Continue IV Decadron, remdesivir  · Start Baricitinib   · Encouraged IS and Pronation   · Albuterol inhalers PRN   · Continue symptomatic management    Hypokalemia  Assessment & Plan  · No labs today, will recheck tomorrow     Morbid obesity with BMI of 40 0-44 9, adult [facility])  Assessment & Plan  · Outpatient follow-up  · Would benefit from lifestyle modifications  · This is also a risk factor for sub optimal outcomes post COVID infection and therefore we need to closely monitor      VTE Pharmacologic Prophylaxis:   Moderate Risk (Score 3-4) - Pharmacological DVT Prophylaxis Ordered: enoxaparin (Lovenox)  Patient Centered Rounds: I performed bedside rounds with nursing staff today  Discussions with Specialists or Other Care Team Provider: nursing     Education and Discussions with Family / Patient: Attempted to update  (daughter) via phone  Left voicemail  Yuan Bailey  [phone]    Time Spent for Care: 30 minutes  More than 50% of total time spent on counseling and coordination of care as described above  Current Length of Stay: 3 day(s)  Current Patient Status: Inpatient   Certification Statement: The patient will continue to require additional inpatient hospital stay due to pending improvement in hypocia and covid-19 sx   Discharge Plan: Anticipate discharge in 48 hrs to home      Code Status: Level 1 - Full Code    Subjective:   Pt seen and examined at bedside  Fatigued  Less sob but still coughing  Objective:     Vitals:   Temp (24hrs), Av 7 °F (36 5 °C), Min:97 6 °F (36 4 °C), Max:97 8 °F (36 6 °C)    Temp:  [97 6 °F (36 4 °C)-97 8 °F (36 6 °C)] 97 8 °F (36 6 °C)  HR:  [] 102  Resp:  [18-19] 18  BP: (126-127)/(74-84) 126/74  SpO2:  [92 %-96 %] 95 %  Body mass index is 41 6 kg/m²  Input and Output Summary (last 24 hours): Intake/Output Summary (Last 24 hours) at 2022 1417  Last data filed at 2022 0501  Gross per 24 hour   Intake 600 ml   Output 1300 ml   Net -700 ml       Physical Exam:   Physical Exam  Constitutional:       Appearance: Normal appearance  HENT:      Head: Normocephalic and atraumatic  Mouth/Throat:      Mouth: Mucous membranes are moist    Eyes:      Extraocular Movements: Extraocular movements intact  Cardiovascular:      Rate and Rhythm: Normal rate and regular rhythm  Pulmonary:      Effort: Pulmonary effort is normal       Breath sounds: Rhonchi present  Comments: 99% on 6L NC  Rhonchi with coarse sounds in bases  Abdominal:      General: Abdomen is flat  Palpations: Abdomen is soft  Musculoskeletal:         General: No swelling  Normal range of motion  Cervical back: Normal range of motion and neck supple  Skin:     General: Skin is warm and dry  Neurological:      General: No focal deficit present  Mental Status: He is alert and oriented to person, place, and time     Psychiatric:         Mood and Affect: Mood normal          Behavior: Behavior normal        Additional Data:     Labs:  Results from last 7 days   Lab Units 22  0500 22  0449 01/10/22  1244   WBC Thousand/uL 11 08*   < > 11 01*   HEMOGLOBIN g/dL 14 7   < > 15 6   HEMATOCRIT % 42 0   < > 45 3   PLATELETS Thousands/uL 617*   < > 499*   LYMPHO PCT %  --   --  9*   MONO PCT %  --   --  12   EOS PCT %  --   --  1    < > = values in this interval not displayed  Results from last 7 days   Lab Units 01/12/22  0500 01/11/22  0449 01/11/22  0449   SODIUM mmol/L 137   < > 133*   POTASSIUM mmol/L 3 4*   < > 3 3*   CHLORIDE mmol/L 100   < > 95*   CO2 mmol/L 27   < > 29   BUN mg/dL 12   < > 16   CREATININE mg/dL 0 97   < > 1 05   ANION GAP mmol/L 10   < > 9   CALCIUM mg/dL 9 3   < > 8 9   ALBUMIN g/dL  --   --  2 4*   TOTAL BILIRUBIN mg/dL  --   --  1 25*   ALK PHOS U/L  --   --  105   ALT U/L  --   --  73   AST U/L  --   --  47*   GLUCOSE RANDOM mg/dL 156*   < > 194*    < > = values in this interval not displayed  Results from last 7 days   Lab Units 01/10/22  1244   INR  1 04             Results from last 7 days   Lab Units 01/10/22  1508 01/10/22  1244   LACTIC ACID mmol/L 1 5 2 1*       Lines/Drains:  Invasive Devices  Report    Peripheral Intravenous Line            Peripheral IV 01/10/22 Left Antecubital 3 days                      Imaging: Reviewed radiology reports from this admission including: chest CT scan    Recent Cultures (last 7 days):         Last 24 Hours Medication List:   Current Facility-Administered Medications   Medication Dose Route Frequency Provider Last Rate    acetaminophen  650 mg Oral Q4H PRN Radha Quick MD      albuterol  2 puff Inhalation Q4H PRN Radha Quick MD      Baricitinib  4 mg Oral Q24H Roddy Saenz PA-C      benzonatate  100 mg Oral TID PRN Rc Alexander PA-C      dexamethasone  6 mg Intravenous Q24H Radha Quick MD      enoxaparin  40 mg Subcutaneous Q12H [facility] Radha Quick MD      famotidine  20 mg Oral BID Radha Quick MD      ondansetron  4 mg Intravenous Q4H PRN Radha Quick MD      remdesivir  100 mg Intravenous Q24H Radha Quick MD          Today, Patient Was Seen By: Elise Justice PA-C    **Please Note: This note may have been constructed using a voice recognition system  **

## 2022-01-14 LAB
ANION GAP SERPL CALCULATED.3IONS-SCNC: 6 MMOL/L (ref 4–13)
BASOPHILS # BLD AUTO: 0.01 THOUSANDS/ΜL (ref 0–0.1)
BASOPHILS NFR BLD AUTO: 0 % (ref 0–1)
BUN SERPL-MCNC: 9 MG/DL (ref 5–25)
CALCIUM SERPL-MCNC: 9.3 MG/DL (ref 8.3–10.1)
CHLORIDE SERPL-SCNC: 105 MMOL/L (ref 100–108)
CO2 SERPL-SCNC: 33 MMOL/L (ref 21–32)
CREAT SERPL-MCNC: 0.95 MG/DL (ref 0.6–1.3)
EOSINOPHIL # BLD AUTO: 0 THOUSAND/ΜL (ref 0–0.61)
EOSINOPHIL NFR BLD AUTO: 0 % (ref 0–6)
ERYTHROCYTE [DISTWIDTH] IN BLOOD BY AUTOMATED COUNT: 13.4 % (ref 11.6–15.1)
GFR SERPL CREATININE-BSD FRML MDRD: 94 ML/MIN/1.73SQ M
GLUCOSE SERPL-MCNC: 195 MG/DL (ref 65–140)
HCT VFR BLD AUTO: 47.4 % (ref 36.5–49.3)
HGB BLD-MCNC: 15.8 G/DL (ref 12–17)
IMM GRANULOCYTES # BLD AUTO: 0.08 THOUSAND/UL (ref 0–0.2)
IMM GRANULOCYTES NFR BLD AUTO: 1 % (ref 0–2)
LYMPHOCYTES # BLD AUTO: 1.27 THOUSANDS/ΜL (ref 0.6–4.47)
LYMPHOCYTES NFR BLD AUTO: 17 % (ref 14–44)
MCH RBC QN AUTO: 31.3 PG (ref 26.8–34.3)
MCHC RBC AUTO-ENTMCNC: 33.3 G/DL (ref 31.4–37.4)
MCV RBC AUTO: 94 FL (ref 82–98)
MONOCYTES # BLD AUTO: 0.54 THOUSAND/ΜL (ref 0.17–1.22)
MONOCYTES NFR BLD AUTO: 7 % (ref 4–12)
NEUTROPHILS # BLD AUTO: 5.73 THOUSANDS/ΜL (ref 1.85–7.62)
NEUTS SEG NFR BLD AUTO: 75 % (ref 43–75)
NRBC BLD AUTO-RTO: 0 /100 WBCS
PLATELET # BLD AUTO: 648 THOUSANDS/UL (ref 149–390)
PMV BLD AUTO: 9.2 FL (ref 8.9–12.7)
POTASSIUM SERPL-SCNC: 4.3 MMOL/L (ref 3.5–5.3)
RBC # BLD AUTO: 5.04 MILLION/UL (ref 3.88–5.62)
SODIUM SERPL-SCNC: 144 MMOL/L (ref 136–145)
WBC # BLD AUTO: 7.63 THOUSAND/UL (ref 4.31–10.16)

## 2022-01-14 PROCEDURE — 99232 SBSQ HOSP IP/OBS MODERATE 35: CPT | Performed by: NURSE PRACTITIONER

## 2022-01-14 PROCEDURE — 94760 N-INVAS EAR/PLS OXIMETRY 1: CPT

## 2022-01-14 PROCEDURE — 80048 BASIC METABOLIC PNL TOTAL CA: CPT | Performed by: PHYSICIAN ASSISTANT

## 2022-01-14 PROCEDURE — 85025 COMPLETE CBC W/AUTO DIFF WBC: CPT | Performed by: PHYSICIAN ASSISTANT

## 2022-01-14 PROCEDURE — 94003 VENT MGMT INPAT SUBQ DAY: CPT

## 2022-01-14 RX ADMIN — DEXAMETHASONE SODIUM PHOSPHATE 6 MG: 4 INJECTION, SOLUTION INTRA-ARTICULAR; INTRALESIONAL; INTRAMUSCULAR; INTRAVENOUS; SOFT TISSUE at 15:56

## 2022-01-14 RX ADMIN — ENOXAPARIN SODIUM 40 MG: 40 INJECTION SUBCUTANEOUS at 20:05

## 2022-01-14 RX ADMIN — BARICITINIB 4 MG: 2 TABLET, FILM COATED ORAL at 09:56

## 2022-01-14 RX ADMIN — FAMOTIDINE 20 MG: 20 TABLET ORAL at 17:14

## 2022-01-14 RX ADMIN — FAMOTIDINE 20 MG: 20 TABLET ORAL at 09:57

## 2022-01-14 RX ADMIN — ENOXAPARIN SODIUM 40 MG: 40 INJECTION SUBCUTANEOUS at 09:57

## 2022-01-14 RX ADMIN — REMDESIVIR 100 MG: 100 INJECTION, POWDER, LYOPHILIZED, FOR SOLUTION INTRAVENOUS at 15:56

## 2022-01-14 NOTE — PLAN OF CARE
Problem: INFECTION - ADULT  Goal: Absence or prevention of progression during hospitalization  Description: INTERVENTIONS:  - Assess and monitor for signs and symptoms of infection  - Monitor lab/diagnostic results  - Monitor all insertion sites, i e  indwelling lines, tubes, and drains  - Monitor endotracheal if appropriate and nasal secretions for changes in amount and color  - [place] appropriate cooling/warming therapies per order  - Administer medications as ordered  - Instruct and encourage patient and family to use good hand hygiene technique  - Identify and instruct in appropriate isolation precautions for identified infection/condition  Outcome: Progressing  Goal: Absence of fever/infection during neutropenic period  Description: INTERVENTIONS:  - Monitor WBC    Outcome: Progressing

## 2022-01-14 NOTE — PLAN OF CARE
Problem: Potential for Falls  Goal: Patient will remain free of falls  Description: INTERVENTIONS:  - Educate patient/family on patient safety including physical limitations  - Instruct patient to call for assistance with activity   - Consult OT/PT to assist with strengthening/mobility   - Keep Call bell within reach  - Keep bed low and locked with side rails adjusted as appropriate  - Keep care items and personal belongings within reach  - Initiate and maintain comfort rounds  - Make Fall Risk Sign visible to staff  - Offer Toileting every  Hours, in advance of need  - Initiate/Maintain alarm  - Obtain necessary fall risk management equipment:   - Apply yellow socks and bracelet for high fall risk patients  - Consider moving patient to room near nurses station  Outcome: Progressing     Problem: INFECTION - ADULT  Goal: Absence or prevention of progression during hospitalization  Description: INTERVENTIONS:  - Assess and monitor for signs and symptoms of infection  - Monitor lab/diagnostic results  - Monitor all insertion sites, i e  indwelling lines, tubes, and drains  - Monitor endotracheal if appropriate and nasal secretions for changes in amount and color  - [place] appropriate cooling/warming therapies per order  - Administer medications as ordered  - Instruct and encourage patient and family to use good hand hygiene technique  - Identify and instruct in appropriate isolation precautions for identified infection/condition  Outcome: Progressing  Goal: Absence of fever/infection during neutropenic period  Description: INTERVENTIONS:  - Monitor WBC    Outcome: Progressing     Problem: SAFETY ADULT  Goal: Patient will remain free of falls  Description: INTERVENTIONS:  - Educate patient/family on patient safety including physical limitations  - Instruct patient to call for assistance with activity   - Consult OT/PT to assist with strengthening/mobility   - Keep Call bell within reach  - Keep bed low and locked with side rails adjusted as appropriate  - Keep care items and personal belongings within reach  - Initiate and maintain comfort rounds  - Make Fall Risk Sign visible to staff  - Offer Toileting every  Hours, in advance of need  - Initiate/Maintain alarm  - Obtain necessary fall risk management equipment:   - Apply yellow socks and bracelet for high fall risk patients  - Consider moving patient to room near nurses station  Outcome: Progressing  Goal: Maintain or return to baseline ADL function  Description: INTERVENTIONS:  -  Assess patient's ability to carry out ADLs; assess patient's baseline for ADL function and identify physical deficits which impact ability to perform ADLs (bathing, care of mouth/teeth, toileting, grooming, dressing, etc )  - Assess/evaluate cause of self-care deficits   - Assess range of motion  - Assess patient's mobility; develop plan if impaired  - Assess patient's need for assistive devices and provide as appropriate  - Encourage maximum independence but intervene and supervise when necessary  - Involve family in performance of ADLs  - Assess for home care needs following discharge   - Consider OT consult to assist with ADL evaluation and planning for discharge  - Provide patient education as appropriate  Outcome: Progressing  Goal: Maintains/Returns to pre admission functional level  Description: INTERVENTIONS:  - Perform BMAT or MOVE assessment daily    - Set and communicate daily mobility goal to care team and patient/family/caregiver  - Collaborate with rehabilitation services on mobility goals if consulted  - Perform Range of Motion  times a day  - Reposition patient every  hours    - Dangle patient  times a day  - Stand patient  times a day  - Ambulate patient  times a day  - Out of bed to chair  times a day   - Out of bed for meals times a day  - Out of bed for toileting  - Record patient progress and toleration of activity level   Outcome: Progressing     Problem: DISCHARGE PLANNING  Goal: Discharge to home or other facility with appropriate resources  Description: INTERVENTIONS:  - Identify barriers to discharge w/patient and caregiver  - Arrange for needed discharge resources and transportation as appropriate  - Identify discharge learning needs (meds, wound care, etc )  - Arrange for interpretive services to assist at discharge as needed  - Refer to Case Management Department for coordinating discharge planning if the patient needs post-hospital services based on physician/advanced practitioner order or complex needs related to functional status, cognitive ability, or social support system  Outcome: Progressing     Problem: Knowledge Deficit  Goal: Patient/family/caregiver demonstrates understanding of disease process, treatment plan, medications, and discharge instructions  Description: Complete learning assessment and assess knowledge base  Interventions:  - Provide teaching at level of understanding  - Provide teaching via preferred learning methods  Outcome: Progressing     Problem: RESPIRATORY - ADULT  Goal: Achieves optimal ventilation and oxygenation  Description: INTERVENTIONS:  - Assess for changes in respiratory status  - Assess for changes in mentation and behavior  - Position to facilitate oxygenation and minimize respiratory effort  - Oxygen administered by appropriate delivery if ordered  - Initiate smoking cessation education as indicated  - Encourage broncho-pulmonary hygiene including cough, deep breathe, Incentive Spirometry  - Assess the need for suctioning and aspirate as needed  - Assess and instruct to report SOB or any respiratory difficulty  - Respiratory Therapy support as indicated  Outcome: Progressing     Problem: Nutrition/Hydration-ADULT  Goal: Nutrient/Hydration intake appropriate for improving, restoring or maintaining nutritional needs  Description: Monitor and assess patient's nutrition/hydration status for malnutrition   Collaborate with interdisciplinary team and initiate plan and interventions as ordered  Monitor patient's weight and dietary intake as ordered or per policy  Utilize nutrition screening tool and intervene as necessary  Determine patient's food preferences and provide high-protein, high-caloric foods as appropriate       INTERVENTIONS:  - Monitor oral intake, urinary output, labs, and treatment plans  - Assess nutrition and hydration status and recommend course of action  - Evaluate amount of meals eaten  - Assist patient with eating if necessary   - Allow adequate time for meals  - Recommend/ encourage appropriate diets, oral nutritional supplements, and vitamin/mineral supplements  - Order, calculate, and assess calorie counts as needed  - Recommend, monitor, and adjust tube feedings and TPN/PPN based on assessed needs  - Assess need for intravenous fluids  - Provide specific nutrition/hydration education as appropriate  - Include patient/family/caregiver in decisions related to nutrition  Outcome: Progressing

## 2022-01-15 VITALS
DIASTOLIC BLOOD PRESSURE: 95 MMHG | SYSTOLIC BLOOD PRESSURE: 148 MMHG | BODY MASS INDEX: 41.65 KG/M2 | HEART RATE: 118 BPM | OXYGEN SATURATION: 92 % | RESPIRATION RATE: 20 BRPM | HEIGHT: 65 IN | WEIGHT: 250 LBS | TEMPERATURE: 98.6 F

## 2022-01-15 PROBLEM — E87.6 HYPOKALEMIA: Status: RESOLVED | Noted: 2022-01-10 | Resolved: 2022-01-15

## 2022-01-15 LAB
ANION GAP SERPL CALCULATED.3IONS-SCNC: 6 MMOL/L (ref 4–13)
BUN SERPL-MCNC: 10 MG/DL (ref 5–25)
CALCIUM SERPL-MCNC: 9.1 MG/DL (ref 8.3–10.1)
CHLORIDE SERPL-SCNC: 100 MMOL/L (ref 100–108)
CO2 SERPL-SCNC: 34 MMOL/L (ref 21–32)
CREAT SERPL-MCNC: 0.92 MG/DL (ref 0.6–1.3)
ERYTHROCYTE [DISTWIDTH] IN BLOOD BY AUTOMATED COUNT: 13.2 % (ref 11.6–15.1)
GFR SERPL CREATININE-BSD FRML MDRD: 98 ML/MIN/1.73SQ M
GLUCOSE SERPL-MCNC: 197 MG/DL (ref 65–140)
HCT VFR BLD AUTO: 47.1 % (ref 36.5–49.3)
HGB BLD-MCNC: 15.7 G/DL (ref 12–17)
MCH RBC QN AUTO: 31.3 PG (ref 26.8–34.3)
MCHC RBC AUTO-ENTMCNC: 33.3 G/DL (ref 31.4–37.4)
MCV RBC AUTO: 94 FL (ref 82–98)
PLATELET # BLD AUTO: 700 THOUSANDS/UL (ref 149–390)
PMV BLD AUTO: 9.2 FL (ref 8.9–12.7)
POTASSIUM SERPL-SCNC: 3.7 MMOL/L (ref 3.5–5.3)
RBC # BLD AUTO: 5.02 MILLION/UL (ref 3.88–5.62)
SODIUM SERPL-SCNC: 140 MMOL/L (ref 136–145)
WBC # BLD AUTO: 9.2 THOUSAND/UL (ref 4.31–10.16)

## 2022-01-15 PROCEDURE — 94761 N-INVAS EAR/PLS OXIMETRY MLT: CPT

## 2022-01-15 PROCEDURE — 80048 BASIC METABOLIC PNL TOTAL CA: CPT | Performed by: NURSE PRACTITIONER

## 2022-01-15 PROCEDURE — 85027 COMPLETE CBC AUTOMATED: CPT | Performed by: NURSE PRACTITIONER

## 2022-01-15 PROCEDURE — 99239 HOSP IP/OBS DSCHRG MGMT >30: CPT | Performed by: PHYSICIAN ASSISTANT

## 2022-01-15 RX ORDER — ALBUTEROL SULFATE 90 UG/1
2 AEROSOL, METERED RESPIRATORY (INHALATION) EVERY 4 HOURS PRN
Qty: 8 G | Refills: 0 | Status: SHIPPED | OUTPATIENT
Start: 2022-01-15

## 2022-01-15 RX ORDER — BENZONATATE 100 MG/1
100 CAPSULE ORAL 3 TIMES DAILY PRN
Qty: 30 CAPSULE | Refills: 0 | Status: SHIPPED | OUTPATIENT
Start: 2022-01-15

## 2022-01-15 RX ORDER — DEXAMETHASONE 2 MG/1
TABLET ORAL
Qty: 16 TABLET | Refills: 0 | Status: SHIPPED | OUTPATIENT
Start: 2022-01-15 | End: 2022-01-20

## 2022-01-15 RX ADMIN — BARICITINIB 4 MG: 2 TABLET, FILM COATED ORAL at 08:02

## 2022-01-15 RX ADMIN — FAMOTIDINE 20 MG: 20 TABLET ORAL at 08:02

## 2022-01-15 RX ADMIN — ENOXAPARIN SODIUM 40 MG: 40 INJECTION SUBCUTANEOUS at 08:02

## 2022-01-15 NOTE — ASSESSMENT & PLAN NOTE
· Patient presented with shortness of breath and hypoxia, known COVID positive initially 12/26  · Received COVID vaccines on 11/26 and 12/17 (moderna)  · The patient was up to 15 liters of mid flow, now weaned to 4 liters NC  · CTA chest neg for PE, D-dimer <2   · Continue IV Decadron 6/10  · IV remdesivir completed on 01/14  · Continue Baricitinib 3/14  · Encouraged IS and Pronation    · Albuterol inhalers PRN, tessalon perles  · Continue symptomatic management

## 2022-01-15 NOTE — RESPIRATORY THERAPY NOTE
Home Oxygen Qualifying Test       Patient name: Deshaun Baez        : 1975   Date of Test:  January 15, 2022  Diagnosis: Covid     Home Oxygen Test:    **Medicare Guidelines require item(s) 1-5 on all ambulatory patients or 1 and 2 on non-ambulatory patients  1   Baseline SPO2 on Room Air at rest 92 %  2   SPO2 during exercise on Room Air 89 %  During exercise monitor SpO2  If SPO2 increases >=89% with ambulation do not add supplemental             oxygen  If <= 88% on room air add O2 via NC and titrate patient  Patient must be ambulated with O2 and titrated to > 88% with exertion  3   SPO2 on Oxygen at rest N/A     4   SPO2 during exercise on Oxygen  N/A     5   Exercise performed:          walking, duration 6 (min)          []  Supplemental Home Oxygen is indicated  [x]  Client does not qualify for home oxygen  Respiratory Additional Notes- Pt ambulated in room and offered no complaints  Pt does not require home O2      Marya Yun

## 2022-01-15 NOTE — DISCHARGE INSTRUCTIONS
Dexamethasone (DexPak 10 Day TaperPak, DexPak 13 Day TaperPak, DexPak 6 Day TaperPak, Dexamethasone Intensol, Decadron, Hemady) - (By mouth)     Why this medicine is used:   Treats inflammation, severe allergies, flare-ups of ongoing illnesses, and many other medical problems  May also be used to decrease some symptoms of cancer, including multiple myeloma  Contact a nurse or doctor right away if you have:  · Blurred vision or other changes in vision, trouble seeing, eye pain  · Chest pain, trouble breathing, coughing up blood  · Dark freckles, skin color changes, coldness, tiredness, weight loss  · Depression, unusual thoughts, feelings, or behaviors, trouble sleeping  · Rapid weight gain, swelling in your hands, ankles, or feet  · Severe stomach pain, nausea, vomiting, or red or black stools  · Unusual bleeding or bruising, muscle pain or weakness     Common side effects:  · Round, puffy face, weight gain around your neck, upper back, breast, face, or waist    © Copyright [employer] 2021 Information is for End User's use only and may not be sold, redistributed or otherwise used for commercial purposes  Benzonatate (Tessalon Perles, Zonatuss) - (By mouth)     Why this medicine is used:   Treats cough  Contact a nurse or doctor right away if you have:  · Skin rash     Common side effects:  · Drowsiness  · Nausea, upset stomach  · Headache    © [facility] 2021 Information is for End User's use only and may not be sold, redistributed or otherwise used for commercial purposes  Albuterol (Proventil, VoSpire, VoSpire ER) - (By mouth)     Why this medicine is used:   Treats bronchospasm    Contact a nurse or doctor right away if you have:  · Itching or hives, swelling in your face, mouth, throat, or hands, chest tightness, trouble breathing  · Chest pain  · Fast, pounding, or uneven heartbeat     Common side effects:  · Shakiness, restlessness, nervousness, excitement, or trouble sleeping    © [facility] 2021 Information is for End User's use only and may not be sold, redistributed or otherwise used for commercial purposes

## 2022-01-15 NOTE — DISCHARGE SUMMARY
[address] [facility]  Discharge- [address] [address] 1975, 51 y o  male MRN: [id number]  Unit/Bed#: [facility] 02-01 Encounter: [id number]  Primary Care Provider: No primary care provider on file  Date and time admitted to hospital: 1/10/2022 12:21 PM    * Acute hypoxemic respiratory failure due to COVID-19 [facility])  Assessment & Plan  · Patient presented with shortness of breath and hypoxia, known COVID positive initially 12/26  · Received COVID vaccines on 11/26 and 12/17 (moderna)  · The patient was up to 15 liters of mid flow, now weaned to 4 liters NC  · CTA chest neg for PE, D-dimer <2   · Continue Decadron for a few more days on discharge   · IV remdesivir completed on 01/14  · Completed Baricitinib x3 days  · Encouraged IS and Pronation on discharge    · Albuterol inhalers PRN, tessalon perles  · Continue isolation for 10 days total     Morbid obesity with BMI of 40 0-44 9, adult [facility])  Assessment & Plan  · Outpatient follow-up  · Would benefit from lifestyle modifications  · This is also a risk factor for sub optimal outcomes post COVID infection and therefore we need to closely monitor    Hypokalemia-resolved as of 1/15/2022  Assessment & Plan  · Resolved with supplementation      Medical Problems             Resolved Problems  Date Reviewed: 1/15/2022          Resolved    Hypokalemia 1/15/2022     Resolved by  Valentin Clark PA-C    Hyponatremia 1/13/2022     Resolved by  Valentin Clark PA-C              Discharging Physician / Practitioner: Valentin Clark PA-C  PCP: No primary care provider on file    Admission Date:   Admission Orders (From admission, onward)     Ordered        01/10/22 1502  Inpatient Admission  Once                      Discharge Date: 01/15/22    Consultations During Hospital Stay:  · None     Procedures Performed:   · None     Significant Findings / Test Results:   · None     Incidental Findings:   · None      Test Results Pending at Discharge (will require follow up): · None      Outpatient Tests Requested:  · PCP follow up    Complications:  None     Reason for Admission: COVD-19 pneumonia     Hospital Course:   Roxine Phoenix is a 51 y o  male patient who originally presented to the hospital on 1/10/2022 due to COVID symptoms of worsening shortness of breath and cough  Initially diagnosed 12/26/2021 had received Alvaro Flatness vaccination #2 on 12/17/2021  Noted for hypoxia in ER and admitted for monitoring/treatment  Now stable, on room air  Completed remdesivir course  Discharge home  Please see above list of diagnoses and related plan for additional information  Condition at Discharge: good    Discharge Day Visit / Exam:   S:  Patient seen, doing well  Really wants to go home  Denies chest pain or shortness of breath  Denies palpitations  Was able to ambulate without any significant shortness of breath or weakness  O:   /95 (BP Location: Left arm)   Pulse (!) 118   Temp 98 6 °F (37 °C) (Oral)   Resp 20   Ht 5' 5" (1 651 m)   Wt 113 kg (250 lb)   SpO2 92%   BMI 41 60 kg/m²    Physical Exam  Vitals and nursing note reviewed  Constitutional:       General: He is not in acute distress  Appearance: He is morbidly obese  He is not toxic-appearing  Cardiovascular:      Rate and Rhythm: Normal rate and regular rhythm  Heart sounds: Normal heart sounds  Pulmonary:      Effort: Pulmonary effort is normal  No respiratory distress  Breath sounds: Normal breath sounds  Skin:     General: Skin is warm and dry  Coloration: Skin is not cyanotic or pale  Neurological:      Mental Status: He is alert and oriented to person, place, and time  Discussion with Family: Patient declined call to   Discharge instructions/Information to patient and family:   See after visit summary for information provided to patient and family        Provisions for Follow-Up Care:  See after visit summary for information related to follow-up care and any pertinent home health orders  Disposition:   Home    Planned Readmission: none     Discharge Statement:  I spent 35 minutes discharging the patient  This time was spent on the day of discharge  I had direct contact with the patient on the day of discharge  Greater than 50% of the total time was spent examining patient, answering all patient questions, arranging and discussing plan of care with patient as well as directly providing post-discharge instructions  Additional time then spent on discharge activities  Discharge Medications:  See after visit summary for reconciled discharge medications provided to patient and/or family        **Please Note: This note may have been constructed using a voice recognition system**

## 2022-01-15 NOTE — PLAN OF CARE
Problem: INFECTION - ADULT  Goal: Absence or prevention of progression during hospitalization  Description: INTERVENTIONS:  - Assess and monitor for signs and symptoms of infection  - Monitor lab/diagnostic results  - Monitor all insertion sites, i e  indwelling lines, tubes, and drains  - Monitor endotracheal if appropriate and nasal secretions for changes in amount and color  - [place] appropriate cooling/warming therapies per order  - Administer medications as ordered  - Instruct and encourage patient and family to use good hand hygiene technique  - Identify and instruct in appropriate isolation precautions for identified infection/condition  Outcome: Progressing  Goal: Absence of fever/infection during neutropenic period  Description: INTERVENTIONS:  - Monitor WBC    Outcome: Progressing     Problem: DISCHARGE PLANNING  Goal: Discharge to home or other facility with appropriate resources  Description: INTERVENTIONS:  - Identify barriers to discharge w/patient and caregiver  - Arrange for needed discharge resources and transportation as appropriate  - Identify discharge learning needs (meds, wound care, etc )  - Arrange for interpretive services to assist at discharge as needed  - Refer to Case Management Department for coordinating discharge planning if the patient needs post-hospital services based on physician/advanced practitioner order or complex needs related to functional status, cognitive ability, or social support system  Outcome: Progressing     Problem: Knowledge Deficit  Goal: Patient/family/caregiver demonstrates understanding of disease process, treatment plan, medications, and discharge instructions  Description: Complete learning assessment and assess knowledge base    Interventions:  - Provide teaching at level of understanding  - Provide teaching via preferred learning methods  Outcome: Progressing     Problem: RESPIRATORY - ADULT  Goal: Achieves optimal ventilation and oxygenation  Description: INTERVENTIONS:  - Assess for changes in respiratory status  - Assess for changes in mentation and behavior  - Position to facilitate oxygenation and minimize respiratory effort  - Oxygen administered by appropriate delivery if ordered  - Initiate smoking cessation education as indicated  - Encourage broncho-pulmonary hygiene including cough, deep breathe, Incentive Spirometry  - Assess the need for suctioning and aspirate as needed  - Assess and instruct to report SOB or any respiratory difficulty  - Respiratory Therapy support as indicated  Outcome: Progressing

## 2022-01-17 NOTE — UTILIZATION REVIEW
Notification of Discharge   This is a Notification of Discharge from our facility [address]  Please be advised that this patient has been discharge from our facility  Below you will find the admission and discharge date and time including the patients disposition  UTILIZATION REVIEW CONTACT:  Mendez Edouard  Utilization   Network Utilization Review Department  Phone: [phone] x carefully listen to the prompts  All voicemails are confidential   Email: [email or web address]     PHYSICIAN ADVISORY SERVICES:  FOR [id number] REVIEW - MEDICAL NECESSITY DENIAL  Phone: [phone]  Fax: [phone]  Email: [email or web address]     PRESENTATION DATE: 1/10/2022 12:21 PM  OBERVATION ADMISSION DATE:   INPATIENT ADMISSION DATE: 1/10/22  3:02 PM   DISCHARGE DATE: 1/15/2022  4:13 PM  DISPOSITION: Home/Self Care Home/Self Care      IMPORTANT INFORMATION:  Send all requests for admission clinical reviews, approved or denied determinations and any other requests to dedicated fax number below belonging to the campus where the patient is receiving treatment   List of dedicated fax numbers:  [address] DENIALS (Administrative/Medical Necessity) [phone]   [address] (Maternity/NICU/Pediatrics) [phone]   [facility] [phone]   130 [facility] [phone]   [address] [place] [address] [phone]   2000 [facility] [address] [place] [address],[address] [phone]   [facility]  [phone]   [address] [place] [address], [facility]  [address] [facility] [address] [facility] [phone]

## 2022-03-09 NOTE — PROGRESS NOTES
[address] [facility]  Progress Note - Marilee Just 1975, 51 y o  male MRN: [id number]  Unit/Bed#: ENDO 02-01 Encounter: [id number]  Primary Care Provider: No primary care provider on file  Date and time admitted to hospital: 1/10/2022 12:21 PM    * Acute hypoxemic respiratory failure due to COVID-19 [facility])  Assessment & Plan  · Patient presented with shortness of breath and hypoxia, known COVID positive initially 12/26  · Received COVID vaccines on 11/26 and 12/17 (moderna)  · The patient was up to 15 liters of mid flow, now weaned to 4 liters NC  · CTA chest neg for PE, D-dimer <2   · Continue IV Decadron 5/10  · IV remdesivir completed on 01/14  · Continue Baricitinib 2/14  · Encouraged IS and Pronation    · Albuterol inhalers PRN   · Continue symptomatic management    Hypokalemia  Assessment & Plan  · Resolved with supplementation    Morbid obesity with BMI of 40 0-44 9, adult ([facility] [address] )  Assessment & Plan  · Outpatient follow-up  · Would benefit from lifestyle modifications  · This is also a risk factor for sub optimal outcomes post COVID infection and therefore we need to closely monitor       VTE Pharmacologic Prophylaxis: VTE Score: 3 Moderate Risk (Score 3-4) - Pharmacological DVT Prophylaxis Ordered: enoxaparin (Lovenox)  Patient Centered Rounds: I performed bedside rounds with nursing staff today  Discussions with Specialists or Other Care Team Provider:  Reviewed notes    Education and Discussions with Family / Patient:  Discussed with patient    Time Spent for Care: 20 minutes  More than 50% of total time spent on counseling and coordination of care as described above  Current Length of Stay: 4 day(s)  Current Patient Status: Inpatient   Certification Statement: The patient will continue to require additional inpatient hospital stay due to Oxygen, steroids, remdesivir  Discharge Plan: Anticipate discharge in 24-48 hrs to home      Code Status: Level 1 - Full Code    Subjective: Problem: Intellectual/Education/Knowledge Deficit  Goal: Teaching initiated upon admission  Outcome: Met This Shift  Note: Patient verbalizes understanding to verbal information given on Oxaliplatin /Leucovorin /5FU /CADD ,action and possible side effects. Aware to call MD if develop complications. Intervention: Verbal/written education provided  Note: Chemotherapy Teaching     What is Chemotherapy   Drug action [x]   Method of Administration [x]   Handouts given []     Side Effects  Nausea/vomiting [x]   Diarrhea [x]   Fatigue [x]   Signs / Symptoms of infection [x]   Neutropenia [x]   Thrombocytopenia [x]   Alopecia [x]   neuropathy [x]   [place] diet &  the importance of fluids [x]       Micellaneous  Importance of nutrition [x]   Importance of oral hygiene [x]   When to call the MD [x]   Monitoring labs [x]   Use of supportive services []     Explanation of Drug Regimen / Frequency  Oxaliplatin /Leucovorin /5FU /CADD:  P5K5     Comments  Verbalized understanding to drug,action,side effects and when to call MD         Problem: Discharge Planning  Goal: Knowledge of discharge instructions  Description: Knowledge of discharge instructions  Outcome: Met This Shift  Note: Verbalized understanding of discharge instructions, follow-up appointments, and when to call the physician. Intervention: Discharge to appropriate level of care  Note: Discuss understanding of discharge instructions,follow-up appointments, and when to call the physician. Problem: Falls - Risk of:  Goal: Will remain free from falls  Description: Will remain free from falls  Outcome: Met This Shift  Note: Patient free from falls while in O.P. Oncology. Intervention: Assess risk factors for falls  Description: Assess risk factors for falls  Note: Patient assessed for fall risk on admission to [facility]. Fall band placed on patient. Discussed the need to use the call light for assistance prior to getting up out of chair/bed. Problem: Infection - Central Venous Catheter-Associated Bloodstream Infection:  Goal: Will show no infection signs and symptoms  Description: Will show no infection signs and symptoms  Outcome: Met This Shift  Note: Mediport site with no redness or warmth. Skin over port site intact with no signs of breakdown noted. Patient verbalizes signs/symptoms of port infection and when to notify the physician. Intervention: Infection risk assessment  Description: Infection risk assessment  Note: Discuss port maintenance, infection prevention, signs and when to call Dr Luis Goddard reviewed with patient. Patient verbalize understanding of the plan of care and contribute to goal setting. Resting comfortably in bed does not appear to be in any acute distress denies any chest pain chest tightness shortness of breath or difficulty breathing is using his incentive spirometer appears very well    Objective:     Vitals:   Temp (24hrs), Av 5 °F (36 4 °C), Min:97 5 °F (36 4 °C), Max:97 5 °F (36 4 °C)    Temp:  [97 5 °F (36 4 °C)] 97 5 °F (36 4 °C)  HR:  [53-96] 90  Resp:  [18-20] 18  BP: (116-152)/(58-95) 152/95  SpO2:  [93 %-94 %] 94 %  Body mass index is 41 6 kg/m²  Input and Output Summary (last 24 hours): Intake/Output Summary (Last 24 hours) at 2022 1127  Last data filed at 2022 0900  Gross per 24 hour   Intake 180 ml   Output 1500 ml   Net -1320 ml       Physical Exam:   Physical Exam  Vitals and nursing note reviewed  Cardiovascular:      Rate and Rhythm: Normal rate  Pulses: Normal pulses  Pulmonary:      Breath sounds: Normal breath sounds  Abdominal:      Palpations: Abdomen is soft  Musculoskeletal:         General: Normal range of motion  Skin:     General: Skin is warm  Neurological:      Mental Status: He is alert  Mental status is at baseline     Psychiatric:         Mood and Affect: Mood normal         Additional Data:     Labs:  Results from last 7 days   Lab Units 22  0441   WBC Thousand/uL 7 63   HEMOGLOBIN g/dL 15 8   HEMATOCRIT % 47 4   PLATELETS Thousands/uL 648*   NEUTROS PCT % 75   LYMPHS PCT % 17   MONOS PCT % 7   EOS PCT % 0     Results from last 7 days   Lab Units 22  0441 22  0500 22  0449   SODIUM mmol/L 144   < > 133*   POTASSIUM mmol/L 4 3   < > 3 3*   CHLORIDE mmol/L 105   < > 95*   CO2 mmol/L 33*   < > 29   BUN mg/dL 9   < > 16   CREATININE mg/dL 0 95   < > 1 05   ANION GAP mmol/L 6   < > 9   CALCIUM mg/dL 9 3   < > 8 9   ALBUMIN g/dL  --   --  2 4*   TOTAL BILIRUBIN mg/dL  --   --  1 25*   ALK PHOS U/L  --   --  105   ALT U/L  --   --  73   AST U/L  --   --  47*   GLUCOSE RANDOM mg/dL 195*   < > 194*    < > = values in this interval not displayed  Results from last 7 days   Lab Units 01/10/22  1244   INR  1 04             Results from last 7 days   Lab Units 01/10/22  1508 01/10/22  1244   LACTIC ACID mmol/L 1 5 2 1*       Lines/Drains:  Invasive Devices  Report    Peripheral Intravenous Line            Peripheral IV 01/10/22 Left Antecubital 3 days                      Imaging: No pertinent imaging reviewed  Recent Cultures (last 7 days):       Last 24 Hours Medication List:   Current Facility-Administered Medications   Medication Dose Route Frequency Provider Last Rate    acetaminophen  650 mg Oral Q4H PRN Jewels Ma MD      albuterol  2 puff Inhalation Q4H PRN Jewels Ma MD      Baricitinib  4 mg Oral Q24H Timothy Saenz PA-C      benzonatate  100 mg Oral TID PRN Ryanne Araiza PA-C      dexamethasone  6 mg Intravenous Q24H Jewels Ma MD      enoxaparin  40 mg Subcutaneous Q12H Dayton James MD      famotidine  20 mg Oral BID Jewels Ma MD      ondansetron  4 mg Intravenous Q4H PRN Jewels Ma MD      remdesivir  100 mg Intravenous Q24H Jewels Ma MD          Today, Patient Was Seen By: NALLELY Moctezuma    **Please Note: This note may have been constructed using a voice recognition system  **

## 2023-07-05 NOTE — CASE MANAGEMENT
Case Management Progress Note    Patient name Felicia Chino  Location ENDO 02/ENDO 02- MRN [id number]  : 1975 Date 2022       LOS (days): 4  Geometric Mean LOS (GMLOS) (days):   Days to GMLOS:        OBJECTIVE:        Current admission status: Inpatient  Preferred Pharmacy:   [facility] [address]  [address] [address] [address]  [address] [address]  Phone: [phone] Fax: [phone]    Primary Care Provider: No primary care provider on file  Primary Insurance: BLUE CROSS  Secondary Insurance:     PROGRESS NOTE:    Per SLIM patient will continue to require additional inpatient hospital stay 24-48 hours due to Oxygen, steroids, remdesivir  Hemostasis: Drysol

## 2025-04-24 ENCOUNTER — HOSPITAL ENCOUNTER (INPATIENT)
Facility: HOSPITAL | Age: 50
LOS: 2 days | Discharge: HOME | End: 2025-04-28
Attending: EMERGENCY MEDICINE | Admitting: INTERNAL MEDICINE
Payer: COMMERCIAL

## 2025-04-24 ENCOUNTER — APPOINTMENT (EMERGENCY)
Dept: RADIOLOGY | Facility: HOSPITAL | Age: 50
End: 2025-04-24
Payer: COMMERCIAL

## 2025-04-24 DIAGNOSIS — R35.89 POLYURIA: ICD-10-CM

## 2025-04-24 DIAGNOSIS — R73.9 HYPERGLYCEMIA: Primary | ICD-10-CM

## 2025-04-24 DIAGNOSIS — R42 DIZZINESS: ICD-10-CM

## 2025-04-24 DIAGNOSIS — R63.1 POLYDIPSIA: ICD-10-CM

## 2025-04-24 DIAGNOSIS — E11.9 DM2 (DIABETES MELLITUS, TYPE 2) (HCC): ICD-10-CM

## 2025-04-24 PROBLEM — E78.5 DYSLIPIDEMIA, GOAL LDL BELOW 100: Status: ACTIVE | Noted: 2024-12-18

## 2025-04-24 PROBLEM — K42.9 UMBILICAL HERNIA WITHOUT OBSTRUCTION AND WITHOUT GANGRENE: Status: ACTIVE | Noted: 2024-09-16

## 2025-04-24 PROBLEM — E11.65 TYPE 2 DIABETES MELLITUS WITH HYPERGLYCEMIA, WITHOUT LONG-TERM CURRENT USE OF INSULIN (HCC): Status: ACTIVE | Noted: 2025-04-24

## 2025-04-24 LAB
ALBUMIN SERPL BCG-MCNC: 4.4 G/DL (ref 3.5–5)
ALP SERPL-CCNC: 144 U/L (ref 34–104)
ALT SERPL W P-5'-P-CCNC: 34 U/L (ref 7–52)
ANION GAP SERPL CALCULATED.3IONS-SCNC: 10 MMOL/L (ref 4–13)
AST SERPL W P-5'-P-CCNC: 28 U/L (ref 13–39)
B-OH-BUTYR SERPL-MCNC: 0.06 MMOL/L (ref 0.02–0.27)
BACTERIA UR QL AUTO: NORMAL /HPF
BASE EX.OXY STD BLDV CALC-SCNC: 87.3 % (ref 60–80)
BASE EXCESS BLDV CALC-SCNC: 2.5 MMOL/L
BILIRUB SERPL-MCNC: 1.19 MG/DL (ref 0.2–1)
BILIRUB UR QL STRIP: NEGATIVE
BUN SERPL-MCNC: 11 MG/DL (ref 5–25)
CALCIUM SERPL-MCNC: 9.9 MG/DL (ref 8.4–10.2)
CHLORIDE SERPL-SCNC: 91 MMOL/L (ref 96–108)
CLARITY UR: CLEAR
CO2 SERPL-SCNC: 27 MMOL/L (ref 21–32)
COLOR UR: COLORLESS
CREAT SERPL-MCNC: 1.14 MG/DL (ref 0.6–1.3)
ERYTHROCYTE [DISTWIDTH] IN BLOOD BY AUTOMATED COUNT: 12.4 % (ref 11.6–15.1)
GFR SERPL CREATININE-BSD FRML MDRD: 74 ML/MIN/1.73SQ M
GLUCOSE SERPL-MCNC: 388 MG/DL (ref 65–140)
GLUCOSE SERPL-MCNC: 392 MG/DL (ref 65–140)
GLUCOSE SERPL-MCNC: 434 MG/DL (ref 65–140)
GLUCOSE SERPL-MCNC: 570 MG/DL (ref 65–140)
GLUCOSE SERPL-MCNC: 743 MG/DL (ref 65–140)
GLUCOSE SERPL-MCNC: >600 MG/DL (ref 65–140)
GLUCOSE UR STRIP-MCNC: ABNORMAL MG/DL
HCO3 BLDV-SCNC: 28.6 MMOL/L (ref 24–30)
HCT VFR BLD AUTO: 44.6 % (ref 36.5–49.3)
HGB BLD-MCNC: 16 G/DL (ref 12–17)
HGB UR QL STRIP.AUTO: NEGATIVE
KETONES UR STRIP-MCNC: NEGATIVE MG/DL
LEUKOCYTE ESTERASE UR QL STRIP: ABNORMAL
MCH RBC QN AUTO: 31.3 PG (ref 26.8–34.3)
MCHC RBC AUTO-ENTMCNC: 35.9 G/DL (ref 31.4–37.4)
MCV RBC AUTO: 87 FL (ref 82–98)
NITRITE UR QL STRIP: NEGATIVE
NON-SQ EPI CELLS URNS QL MICRO: NORMAL /HPF
O2 CT BLDV-SCNC: 21.4 ML/DL
PCO2 BLDV: 48.9 MM HG (ref 42–50)
PH BLDV: 7.38 [PH] (ref 7.3–7.4)
PH UR STRIP.AUTO: 5.5 [PH]
PLATELET # BLD AUTO: 286 THOUSANDS/UL (ref 149–390)
PMV BLD AUTO: 10.7 FL (ref 8.9–12.7)
PO2 BLDV: 54.3 MM HG (ref 35–45)
POTASSIUM SERPL-SCNC: 4.1 MMOL/L (ref 3.5–5.3)
PROT SERPL-MCNC: 7.8 G/DL (ref 6.4–8.4)
PROT UR STRIP-MCNC: NEGATIVE MG/DL
RBC # BLD AUTO: 5.11 MILLION/UL (ref 3.88–5.62)
RBC #/AREA URNS AUTO: NORMAL /HPF
SODIUM SERPL-SCNC: 128 MMOL/L (ref 135–147)
SP GR UR STRIP.AUTO: 1.03 (ref 1–1.03)
UROBILINOGEN UR STRIP-ACNC: <2 MG/DL
WBC # BLD AUTO: 12.61 THOUSAND/UL (ref 4.31–10.16)
WBC #/AREA URNS AUTO: NORMAL /HPF

## 2025-04-24 PROCEDURE — 82948 REAGENT STRIP/BLOOD GLUCOSE: CPT

## 2025-04-24 PROCEDURE — 80053 COMPREHEN METABOLIC PANEL: CPT

## 2025-04-24 PROCEDURE — 96361 HYDRATE IV INFUSION ADD-ON: CPT

## 2025-04-24 PROCEDURE — 36415 COLL VENOUS BLD VENIPUNCTURE: CPT

## 2025-04-24 PROCEDURE — 99284 EMERGENCY DEPT VISIT MOD MDM: CPT

## 2025-04-24 PROCEDURE — 82805 BLOOD GASES W/O2 SATURATION: CPT

## 2025-04-24 PROCEDURE — 96365 THER/PROPH/DIAG IV INF INIT: CPT

## 2025-04-24 PROCEDURE — 83036 HEMOGLOBIN GLYCOSYLATED A1C: CPT | Performed by: PHYSICIAN ASSISTANT

## 2025-04-24 PROCEDURE — 71046 X-RAY EXAM CHEST 2 VIEWS: CPT

## 2025-04-24 PROCEDURE — 82010 KETONE BODYS QUAN: CPT

## 2025-04-24 PROCEDURE — 93005 ELECTROCARDIOGRAM TRACING: CPT

## 2025-04-24 PROCEDURE — 99223 1ST HOSP IP/OBS HIGH 75: CPT | Performed by: PHYSICIAN ASSISTANT

## 2025-04-24 PROCEDURE — 99285 EMERGENCY DEPT VISIT HI MDM: CPT

## 2025-04-24 PROCEDURE — 81001 URINALYSIS AUTO W/SCOPE: CPT

## 2025-04-24 PROCEDURE — 85027 COMPLETE CBC AUTOMATED: CPT

## 2025-04-24 RX ORDER — SODIUM CHLORIDE 9 MG/ML
1000 INJECTION, SOLUTION INTRAVENOUS ONCE
Status: COMPLETED | OUTPATIENT
Start: 2025-04-24 | End: 2025-04-24

## 2025-04-24 RX ORDER — ACETAMINOPHEN 325 MG/1
650 TABLET ORAL EVERY 6 HOURS PRN
Status: DISCONTINUED | OUTPATIENT
Start: 2025-04-24 | End: 2025-04-28 | Stop reason: HOSPADM

## 2025-04-24 RX ORDER — ATORVASTATIN CALCIUM 40 MG/1
40 TABLET, FILM COATED ORAL
Status: DISCONTINUED | OUTPATIENT
Start: 2025-04-25 | End: 2025-04-28 | Stop reason: HOSPADM

## 2025-04-24 RX ORDER — MAGNESIUM HYDROXIDE/ALUMINUM HYDROXICE/SIMETHICONE 120; 1200; 1200 MG/30ML; MG/30ML; MG/30ML
30 SUSPENSION ORAL EVERY 6 HOURS PRN
Status: DISCONTINUED | OUTPATIENT
Start: 2025-04-24 | End: 2025-04-28 | Stop reason: HOSPADM

## 2025-04-24 RX ORDER — METFORMIN HYDROCHLORIDE 500 MG/1
TABLET, EXTENDED RELEASE ORAL
COMMUNITY
Start: 2024-12-18 | End: 2025-04-28

## 2025-04-24 RX ORDER — ONDANSETRON 2 MG/ML
4 INJECTION INTRAMUSCULAR; INTRAVENOUS EVERY 6 HOURS PRN
Status: DISCONTINUED | OUTPATIENT
Start: 2025-04-24 | End: 2025-04-28 | Stop reason: HOSPADM

## 2025-04-24 RX ORDER — ROSUVASTATIN CALCIUM 20 MG/1
20 TABLET, COATED ORAL DAILY
COMMUNITY
Start: 2024-12-18

## 2025-04-24 RX ADMIN — SODIUM CHLORIDE 1000 ML/HR: 0.9 INJECTION, SOLUTION INTRAVENOUS at 19:39

## 2025-04-24 RX ADMIN — SODIUM CHLORIDE 10 UNITS/HR: 9 INJECTION, SOLUTION INTRAVENOUS at 20:50

## 2025-04-24 NOTE — ED PROVIDER NOTES
Time reflects when diagnosis was documented in both MDM as applicable and the Disposition within this note       Time User Action Codes Description Comment    4/24/2025  9:30 PM Jamel, Ale Add [R73.9] Hyperglycemia     4/24/2025  9:30 PM Jamel, Ale Add [R42] Dizziness     4/24/2025  9:30 PM Jamel, Ale Add [R35.89] Polyuria     4/24/2025  9:30 PM Jamel, Ale Add [R63.1] Polydipsia           ED Disposition       ED Disposition   Admit    Condition   Stable    Date/Time   Thu Apr 24, 2025  9:30 PM    Comment   Case was discussed with Grace MERCADO and the patient's admission status was agreed to be Admission Status: observation status to the service of Dr. Coelho .               Assessment & Plan       Medical Decision Making  50 y.o. male presents to ED for evaluation of polyuria and polydipsia x 3 to 4 months, headache and dizziness x 1.5 weeks.  Further details in HPI.  On physical exam, alert and oriented x 4, GCS 15.  No signs within normal range.  Heart regular rate rhythm, lungs clear to auscultation.  No focal neurologic deficits. Remainder of physical exam without acute abnormality.     Ddx: Includes but not limited to DKA, HHS, hyperglycemia due to type 2 diabetes.     Plan for CBC for infection, CMP for electrolytes/kidney function/LFT, beta hydroxybutyrate, UA, VBG, EKG, chest x-ray    On re-evaluation: in NAD, vital signs are normal. A&O x 3, airway patent, no chest pain or respiratory distress, Abdomen non distended, non tender. M/S no gross deformity, GCS 15     Final Evaluation:  (see ED course for additional MDM)  Given hyperglycemia with glucose over 700 on metabolic panel, potassium within normal limits, patient was started on insulin drip in ED, reached out to internal medicine for further evaluation and treatment.    Amount and/or Complexity of Data Reviewed  Labs: ordered. Decision-making details documented in ED Course.  Radiology: ordered and independent interpretation  performed.    Risk  Prescription drug management.  Decision regarding hospitalization.        ED Course as of 04/24/25 2147   Thu Apr 24, 2025 2019 Sodium(!): 128  Corrected glucose for hyperglycemia - 138   2039 GLUCOSE(!!): 743   2039 Potassium: 4.1  Will initiate insulin infusion as potassium is within normal range       Medications   insulin regular (HumuLIN R,NovoLIN R) 1 Units/mL in sodium chloride 0.9 % 100 mL infusion (10 Units/hr Intravenous New Bag 4/24/25 2050)   sodium chloride 0.9 % infusion (1,000 mL/hr Intravenous New Bag 4/24/25 1939)       ED Risk Strat Scores                    No data recorded        SBIRT 22yo+      Flowsheet Row Most Recent Value   Initial Alcohol Screen: US AUDIT-C     1. How often do you have a drink containing alcohol? 2 Filed at: 04/24/2025 1903   3a. Male UNDER 65: How often do you have five or more drinks on one occasion? 0 Filed at: 04/24/2025 1903   Audit-C Score 2 Filed at: 04/24/2025 1903   TIGRE: How many times in the past year have you...    Used an illegal drug or used a prescription medication for non-medical reasons? Never Filed at: 04/24/2025 1903                            History of Present Illness       Chief Complaint   Patient presents with    Dizziness     Pt to ER complaining of dizziness, urinary frequency ongoing for months, diarrhea, fatigue. Pt reports this has been ongoing for months but fatigue got worse and dizziness now present.        Past Medical History:   Diagnosis Date    DM (diabetes mellitus) (HCC)       Past Surgical History:   Procedure Laterality Date    APPENDECTOMY        Family History   Problem Relation Age of Onset    Cancer Mother     Cancer Father       Social History     Tobacco Use    Smoking status: Never    Smokeless tobacco: Never   Vaping Use    Vaping status: Never Used   Substance Use Topics    Alcohol use: Not Currently    Drug use: No      E-Cigarette/Vaping    E-Cigarette Use Never User       E-Cigarette/Vaping  Substances    Nicotine No     THC No     CBD No     Flavoring No     Other No     Unknown No       I have reviewed and agree with the history as documented.     Patient is a 51y/o male with history of type 2 diabetes on no current medications, hyperlipidemia presenting with increased thirst and urination x 3-4 months, dizziness and headaches x 1.5 weeks. States about 4 months ago he was diagnosed with type 2 diabetes, was placed on metformin, of which he took two pills of and stopped due to stomach cramping. States he has not inquired about starting a different medication regimen for treatment. Has not been taking his blood sugars at home. Reports start of increased thirst and frequent urination shortly after stopping metformin. Reports urine is always clear in color, notes frothiness/occasional bubbles. Says the frequency has gotten worse within the last two weeks, also notes the start of dizziness and headaches within the last two weeks. Reports generalized headache the same as prior headaches. Notes dizziness when he wakes up in the morning, like the room is spinning, that resolves when he starts drinking water. Of note, he has also not been taking his statin due to increased cost of the pill that he cannot afford. Denies chest pain, shortness of breath, palpitations.       Dizziness  Associated symptoms: no chest pain, no palpitations, no shortness of breath and no vomiting        Review of Systems   Constitutional:  Negative for chills and fever.   HENT:  Negative for ear pain and sore throat.    Eyes:  Negative for pain and visual disturbance.   Respiratory:  Negative for cough and shortness of breath.    Cardiovascular:  Negative for chest pain and palpitations.   Gastrointestinal:  Negative for abdominal pain and vomiting.   Endocrine: Positive for polydipsia and polyuria.   Genitourinary:  Positive for frequency. Negative for dysuria and hematuria.   Musculoskeletal:  Negative for arthralgias and back pain.    Skin:  Negative for color change and rash.   Neurological:  Positive for dizziness. Negative for seizures and syncope.   All other systems reviewed and are negative.          Objective       ED Triage Vitals   Temperature Pulse Blood Pressure Respirations SpO2 Patient Position - Orthostatic VS   04/24/25 1900 04/24/25 1900 04/24/25 1900 04/24/25 1900 04/24/25 1900 04/24/25 1900   97.9 °F (36.6 °C) 102 169/92 18 98 % Sitting      Temp Source Heart Rate Source BP Location FiO2 (%) Pain Score    04/24/25 1900 04/24/25 1900 04/24/25 1900 -- 04/24/25 1902    Temporal Monitor Left arm  No Pain      Vitals      Date and Time Temp Pulse SpO2 Resp BP Pain Score FACES Pain Rating User   04/24/25 1902 -- -- -- -- -- No Pain -- AM   04/24/25 1900 97.9 °F (36.6 °C) 102 98 % 18 169/92 -- -- LA            Physical Exam  Vitals and nursing note reviewed.   Constitutional:       General: He is not in acute distress.     Appearance: He is not ill-appearing.   HENT:      Head: Normocephalic and atraumatic.      Right Ear: External ear normal.      Left Ear: External ear normal.      Nose: Nose normal.      Mouth/Throat:      Pharynx: Oropharynx is clear. No oropharyngeal exudate or posterior oropharyngeal erythema.   Eyes:      General: No scleral icterus.     Conjunctiva/sclera: Conjunctivae normal.   Cardiovascular:      Rate and Rhythm: Normal rate.      Pulses: Normal pulses.   Pulmonary:      Effort: Pulmonary effort is normal. No respiratory distress.      Breath sounds: No wheezing, rhonchi or rales.   Chest:      Chest wall: No tenderness.   Abdominal:      General: There is no distension.      Palpations: Abdomen is soft.      Tenderness: There is no abdominal tenderness. There is no right CVA tenderness, left CVA tenderness, guarding or rebound.   Musculoskeletal:         General: Normal range of motion.      Cervical back: Normal range of motion. No rigidity.   Skin:     General: Skin is warm and dry.      Findings: No  erythema.   Neurological:      General: No focal deficit present.      Mental Status: He is alert and oriented to person, place, and time.      Sensory: No sensory deficit.   Psychiatric:         Mood and Affect: Mood normal.         Behavior: Behavior normal.         Results Reviewed       Procedure Component Value Units Date/Time    Fingerstick Glucose (POCT) [[id number]]  (Abnormal) Collected: 04/24/25 2051    Lab Status: Final result Specimen: Blood Updated: 04/24/25 2054     POC Glucose 570 mg/dl     Comprehensive metabolic panel [[id number]]  (Abnormal) Collected: 04/24/25 1936    Lab Status: Final result Specimen: Blood from Arm, Right Updated: 04/24/25 2016     Sodium 128 mmol/L      Potassium 4.1 mmol/L      Chloride 91 mmol/L      CO2 27 mmol/L      ANION GAP 10 mmol/L      BUN 11 mg/dL      Creatinine 1.14 mg/dL      Glucose 743 mg/dL      Calcium 9.9 mg/dL      AST 28 U/L      ALT 34 U/L      Alkaline Phosphatase 144 U/L      Total Protein 7.8 g/dL      Albumin 4.4 g/dL      Total Bilirubin 1.19 mg/dL      eGFR 74 ml/min/1.73sq m     Narrative:      National Kid[employer] Foundation guidelines for Chronic Kidney Disease (CKD):     Stage 1 with normal or high GFR (GFR > 90 mL/min/1.73 square meters)    Stage 2 Mild CKD (GFR = 60-89 mL/min/1.73 square meters)    Stage 3A Moderate CKD (GFR = 45-59 mL/min/1.73 square meters)    Stage 3B Moderate CKD (GFR = 30-44 mL/min/1.73 square meters)    Stage 4 Severe CKD (GFR = 15-29 mL/min/1.73 square meters)    Stage 5 End Stage CKD (GFR <15 mL/min/1.73 square meters)  Note: GFR calculation is accurate only with a steady state creatinine    Beta Hydroxybutyrate [[id number]]  (Normal) Collected: 04/24/25 1936    Lab Status: Final result Specimen: Blood from Arm, Right Updated: 04/24/25 2001     Beta- Hydroxybutyrate 0.06 mmol/L     Urine Microscopic [[id number]]  (Normal) Collected: 04/24/25 1936    Lab Status: Final result Specimen: Urine, Clean Catch Updated: 04/24/25  1958     RBC, UA 1-2 /hpf      WBC, UA 1-2 /hpf      Epithelial Cells Occasional /hpf      Bacteria, UA None Seen /hpf     UA (URINE) with reflex to Scope [[id number]]  (Abnormal) Collected: 04/24/25 1936    Lab Status: Final result Specimen: Urine, Clean Catch Updated: 04/24/25 1953     Color, UA Colorless     Clarity, UA Clear     Specific Gravity, UA 1.035     pH, UA 5.5     Leukocytes, UA Elevated glucose may cause decreased leukocyte values. See urine microscopic for UWBC result     Nitrite, UA Negative     Protein, UA Negative mg/dl      Glucose, UA >=1000 (1%) mg/dl      Ketones, UA Negative mg/dl      Urobilinogen, UA <2.0 mg/dl      Bilirubin, UA Negative     Occult Blood, UA Negative    Blood gas, venous [[id number]]  (Abnormal) Collected: 04/24/25 1936    Lab Status: Final result Specimen: Blood from Arm, Right Updated: 04/24/25 1946     pH, Rogelio 7.385     pCO2, Rogelio 48.9 mm Hg      pO2, Rogelio 54.3 mm Hg      HCO3, Rogelio 28.6 mmol/L      Base Excess, Rogelio 2.5 mmol/L      O2 Content, Rogelio 21.4 ml/dL      O2 HGB, VENOUS 87.3 %     CBC [[id number]]  (Abnormal) Collected: 04/24/25 1936    Lab Status: Final result Specimen: Blood from Arm, Right Updated: 04/24/25 1945     WBC 12.61 Thousand/uL      RBC 5.11 Million/uL      Hemoglobin 16.0 g/dL      Hematocrit 44.6 %      MCV 87 fL      MCH 31.3 pg      MCHC 35.9 g/dL      RDW 12.4 %      Platelets 286 Thousands/uL      MPV 10.7 fL     Fingerstick Glucose (POCT) [[id number]]  (Abnormal) Collected: 04/24/25 1904    Lab Status: Final result Specimen: Blood Updated: 04/24/25 1904     POC Glucose >600 mg/dl             XR chest 2 views   ED Interpretation by Ale Mccullough PA-C (04/24 2118)   No acute cardiopulmonary disease.            ECG 12 Lead Documentation Only    Date/Time: 4/24/2025 7:15 PM    Performed by: Ale Mccullough PA-C  Authorized by: Ale Mccullough PA-C    ECG reviewed by me, the ED Provider: yes    Patient location:  ED  Previous ECG:     Previous ECG:   Compared to current    Comparison ECG info:  1/10/22    Similarity:  No change  Interpretation:     Interpretation: normal    Rate:     ECG rate assessment: normal    Rhythm:     Rhythm: sinus rhythm    ST segments:     ST segments:  Normal  T waves:     T waves: normal    Comments:      Normal sinus rhythm with rate of 100 bpm, left axis deviation.  No ST or T wave abnormality.      ED Medication and Procedure Management   Prior to Admission Medications   Prescriptions Last Dose Informant Patient Reported? Taking?   albuterol (PROVENTIL HFA,VENTOLIN HFA) 90 mcg/act inhaler   No No   Sig: Inhale 2 puffs every 4 (four) hours as needed for wheezing or shortness of breath   benzonatate (TESSALON PERLES) 100 mg capsule   No No   Sig: Take 1 capsule (100 mg total) by mouth 3 (three) times a day as needed for cough      Facility-Administered Medications: None     Patient's Medications   Discharge Prescriptions    No medications on file     No discharge procedures on file.  ED SEPSIS DOCUMENTATION   Time reflects when diagnosis was documented in both MDM as applicable and the Disposition within this note       Time User Action Codes Description Comment    4/24/2025  9:30 PM Ale Mccullough Add [R73.9] Hyperglycemia     4/24/2025  9:30 PM Ale Mccullough Add [R42] Dizziness     4/24/2025  9:30 PM Ale Mccullough Add [R35.89] Polyuria     4/24/2025  9:30 PM Ale Mccullough [R63.1] Polydipsia                  Ale Mccullough PA-C  04/24/25 2343

## 2025-04-24 NOTE — LETTER
[facility] 3RD FLOOR MED SURG UNIT  [address]  [place] [address]  Dept: [phone]    April 28, 2025     Patient: Param Somers   YOB: 1975   Date of Visit: 4/24/2025       To Whom it May Concern:    Param Somers is under my professional care. He was seen in the hospital from 4/24/2025 to 04/28/25.   He may return to work on 04/30/2025 without limitations.    If you have any questions or concerns, please don't hesitate to call.         Sincerely,          Franco Giordano MD

## 2025-04-25 PROBLEM — E66.9 OBESITY: Status: ACTIVE | Noted: 2025-04-25

## 2025-04-25 LAB
ANION GAP SERPL CALCULATED.3IONS-SCNC: 8 MMOL/L (ref 4–13)
ATRIAL RATE: 100 BPM
BUN SERPL-MCNC: 8 MG/DL (ref 5–25)
CALCIUM SERPL-MCNC: 9.5 MG/DL (ref 8.4–10.2)
CHLORIDE SERPL-SCNC: 100 MMOL/L (ref 96–108)
CO2 SERPL-SCNC: 28 MMOL/L (ref 21–32)
CREAT SERPL-MCNC: 0.77 MG/DL (ref 0.6–1.3)
EST. AVERAGE GLUCOSE BLD GHB EST-MCNC: 298 MG/DL
GFR SERPL CREATININE-BSD FRML MDRD: 105 ML/MIN/1.73SQ M
GLUCOSE SERPL-MCNC: 169 MG/DL (ref 65–140)
GLUCOSE SERPL-MCNC: 186 MG/DL (ref 65–140)
GLUCOSE SERPL-MCNC: 204 MG/DL (ref 65–140)
GLUCOSE SERPL-MCNC: 275 MG/DL (ref 65–140)
GLUCOSE SERPL-MCNC: 277 MG/DL (ref 65–140)
GLUCOSE SERPL-MCNC: 329 MG/DL (ref 65–140)
GLUCOSE SERPL-MCNC: 332 MG/DL (ref 65–140)
GLUCOSE SERPL-MCNC: 334 MG/DL (ref 65–140)
GLUCOSE SERPL-MCNC: 339 MG/DL (ref 65–140)
GLUCOSE SERPL-MCNC: 354 MG/DL (ref 65–140)
GLUCOSE SERPL-MCNC: 363 MG/DL (ref 65–140)
GLUCOSE SERPL-MCNC: 382 MG/DL (ref 65–140)
HBA1C MFR BLD: 12 %
P AXIS: 65 DEGREES
POTASSIUM SERPL-SCNC: 3.2 MMOL/L (ref 3.5–5.3)
PR INTERVAL: 144 MS
QRS AXIS: -17 DEGREES
QRSD INTERVAL: 76 MS
QT INTERVAL: 332 MS
QTC INTERVAL: 428 MS
SODIUM SERPL-SCNC: 136 MMOL/L (ref 135–147)
T WAVE AXIS: 58 DEGREES
VENTRICULAR RATE: 100 BPM

## 2025-04-25 PROCEDURE — 80048 BASIC METABOLIC PNL TOTAL CA: CPT | Performed by: PHYSICIAN ASSISTANT

## 2025-04-25 PROCEDURE — 82948 REAGENT STRIP/BLOOD GLUCOSE: CPT

## 2025-04-25 PROCEDURE — 99233 SBSQ HOSP IP/OBS HIGH 50: CPT | Performed by: INTERNAL MEDICINE

## 2025-04-25 PROCEDURE — 99204 OFFICE O/P NEW MOD 45 MIN: CPT | Performed by: INTERNAL MEDICINE

## 2025-04-25 PROCEDURE — 93010 ELECTROCARDIOGRAM REPORT: CPT | Performed by: INTERNAL MEDICINE

## 2025-04-25 RX ORDER — INSULIN GLARGINE 100 [IU]/ML
35 INJECTION, SOLUTION SUBCUTANEOUS EVERY 12 HOURS SCHEDULED
Status: DISCONTINUED | OUTPATIENT
Start: 2025-04-25 | End: 2025-04-26

## 2025-04-25 RX ORDER — INSULIN LISPRO 100 [IU]/ML
1-5 INJECTION, SOLUTION INTRAVENOUS; SUBCUTANEOUS
Status: DISCONTINUED | OUTPATIENT
Start: 2025-04-25 | End: 2025-04-25

## 2025-04-25 RX ORDER — INSULIN GLARGINE 100 [IU]/ML
30 INJECTION, SOLUTION SUBCUTANEOUS EVERY 12 HOURS SCHEDULED
Status: DISCONTINUED | OUTPATIENT
Start: 2025-04-25 | End: 2025-04-25

## 2025-04-25 RX ORDER — INSULIN LISPRO 100 [IU]/ML
15 INJECTION, SOLUTION INTRAVENOUS; SUBCUTANEOUS
Status: DISCONTINUED | OUTPATIENT
Start: 2025-04-25 | End: 2025-04-26

## 2025-04-25 RX ORDER — POTASSIUM CHLORIDE 1500 MG/1
40 TABLET, EXTENDED RELEASE ORAL 2 TIMES DAILY
Status: COMPLETED | OUTPATIENT
Start: 2025-04-25 | End: 2025-04-25

## 2025-04-25 RX ORDER — INSULIN LISPRO 100 [IU]/ML
10 INJECTION, SOLUTION INTRAVENOUS; SUBCUTANEOUS
Status: DISCONTINUED | OUTPATIENT
Start: 2025-04-25 | End: 2025-04-25

## 2025-04-25 RX ORDER — INSULIN LISPRO 100 [IU]/ML
1-6 INJECTION, SOLUTION INTRAVENOUS; SUBCUTANEOUS
Status: DISCONTINUED | OUTPATIENT
Start: 2025-04-25 | End: 2025-04-26

## 2025-04-25 RX ADMIN — POTASSIUM CHLORIDE 40 MEQ: 1500 TABLET, EXTENDED RELEASE ORAL at 09:10

## 2025-04-25 RX ADMIN — INSULIN LISPRO 5 UNITS: 100 INJECTION, SOLUTION INTRAVENOUS; SUBCUTANEOUS at 12:27

## 2025-04-25 RX ADMIN — POTASSIUM CHLORIDE 40 MEQ: 1500 TABLET, EXTENDED RELEASE ORAL at 17:55

## 2025-04-25 RX ADMIN — ATORVASTATIN CALCIUM 40 MG: 40 TABLET, FILM COATED ORAL at 15:52

## 2025-04-25 RX ADMIN — INSULIN GLARGINE 30 UNITS: 100 INJECTION, SOLUTION SUBCUTANEOUS at 09:10

## 2025-04-25 RX ADMIN — INSULIN LISPRO 15 UNITS: 100 INJECTION, SOLUTION INTRAVENOUS; SUBCUTANEOUS at 15:52

## 2025-04-25 RX ADMIN — ACETAMINOPHEN 650 MG: 325 TABLET, FILM COATED ORAL at 15:10

## 2025-04-25 RX ADMIN — INSULIN LISPRO 3 UNITS: 100 INJECTION, SOLUTION INTRAVENOUS; SUBCUTANEOUS at 09:11

## 2025-04-25 RX ADMIN — INSULIN LISPRO 5 UNITS: 100 INJECTION, SOLUTION INTRAVENOUS; SUBCUTANEOUS at 15:51

## 2025-04-25 RX ADMIN — INSULIN LISPRO 10 UNITS: 100 INJECTION, SOLUTION INTRAVENOUS; SUBCUTANEOUS at 09:10

## 2025-04-25 RX ADMIN — INSULIN LISPRO 10 UNITS: 100 INJECTION, SOLUTION INTRAVENOUS; SUBCUTANEOUS at 11:18

## 2025-04-25 RX ADMIN — INSULIN LISPRO 4 UNITS: 100 INJECTION, SOLUTION INTRAVENOUS; SUBCUTANEOUS at 11:18

## 2025-04-25 RX ADMIN — INSULIN GLARGINE 35 UNITS: 100 INJECTION, SOLUTION SUBCUTANEOUS at 21:15

## 2025-04-25 RX ADMIN — SODIUM CHLORIDE 7 UNITS/HR: 9 INJECTION, SOLUTION INTRAVENOUS at 03:59

## 2025-04-25 NOTE — ASSESSMENT & PLAN NOTE
Discussed importance of weight loss and increase physical activity to better help with blood glucose management.

## 2025-04-25 NOTE — ASSESSMENT & PLAN NOTE
Lab Results   Component Value Date    HGBA1C 7.5 (H) 12/12/2024       Recent Labs     04/24/25  1904 04/24/25  2051 04/24/25  2146 04/24/25  2221   POCGLU >600* 570* 434* 392*       Blood Sugar Average: Last 72 hrs:  (P) 349  Diagnosed December 2024, placed metformin but reports only took for 1 day due to side effect of abdominal cramping, no follow-up since then  Presenting with blood glucose of 743, gap WNL, bicarb WNL, no indication of DKA at this time  Corrected sodium 138  Initiated on IV insulin drip with protocol in the ED, will continue  Blood glucose checks every 2 hours while on insulin drip, hypoglycemia protocol  Endocrinology consulted as patient does not want to be on metformin for diabetes management  Nutrition consulted at request of patient  Recheck hemoglobin A1c

## 2025-04-25 NOTE — ASSESSMENT & PLAN NOTE
Lab Results   Component Value Date    HGBA1C 12.0 (H) 04/24/2025       Recent Labs     04/25/25  0909 04/25/25  1116 04/25/25  1151 04/25/25  1341   POCGLU 334* 382* 339* 363*       Blood Sugar Average: Last 72 hrs:  (P) [id number]  Diagnosed December 2024, placed metformin but reports only took for 1 day due to side effect of abdominal cramping, no follow-up since then  Presenting with blood glucose of 743, gap WNL, bicarb WNL, no indication of DKA at this time  Corrected sodium 138  Initiated on IV insulin drip with protocol in the ED  A1c 12    Blood sugars more receptive this morning so have stopped insulin infusion.  Start Lantus 35 units twice a day and mealtime insulin with Humalog 15 as 3 times a day  Sliding scale insulin for corrections  Patient hesitant to start metformin given previous side effects, we can try several other alternatives.  Patient also oriented about low carbohydrate high-protein diet, increase physical activity, importance of weight loss.  Nutrition consult  Endocrinology consult    Pending blood glucose control may be considered for discharge over the weekend  He will need insulin supplies and medications.   Patient needs a copy of his prescriptions faxed to his [employer] insurance company for approval. [phone]. Then his refills sent to [facility] in [facility].

## 2025-04-25 NOTE — ASSESSMENT & PLAN NOTE
Intermittent for weeks with associated polydipsia and polyuria, denies currently during exam  Most likely in setting of uncontrolled diabetes/hyperglycemia  See plan under type 2 diabetes mellitus with hyperglycemia

## 2025-04-25 NOTE — PLAN OF CARE
Problem: Nutrition/Hydration-ADULT  Goal: Nutrient/Hydration intake appropriate for improving, restoring or maintaining nutritional needs  Description: Monitor and assess patient's nutrition/hydration status for malnutrition. Collaborate with interdisciplinary team and initiate plan and interventions as ordered.  Monitor patient's weight and dietary intake as ordered or per policy. Utilize nutrition screening tool and intervene as necessary. Determine patient's food preferences and provide high-protein, high-caloric foods as appropriate. INTERVENTIONS:- Monitor oral intake, urinary output, labs, and treatment plans- Assess nutrition and hydration status and recommend course of action- Evaluate amount of meals eaten- Assist patient with eating if necessary - Allow adequate time for meals- Recommend/ encourage appropriate diets, oral nutritional supplements, and vitamin/mineral supplements- Order, calculate, and assess calorie counts as needed- Recommend, monitor, and adjust tube feedings and TPN/PPN based on assessed needs- Assess need for intravenous fluids- Provide specific nutrition/hydration education as appropriate- Include patient/family/caregiver in decisions related to nutrition  Outcome: Progressing     Problem: METABOLIC, FLUID AND ELECTROLYTES - ADULT  Goal: Glucose maintained within target range  Description: INTERVENTIONS:- Monitor Blood Glucose as ordered- Assess for signs and symptoms of hyperglycemia and hypoglycemia- Administer ordered medications to maintain glucose within target range- Assess nutritional intake and initiate nutrition service referral as needed  Outcome: Progressing

## 2025-04-25 NOTE — PROGRESS NOTES
Progress Note - Hospitalist   Name: Param Somers 50 y.o. male I MRN: [id number]  Unit/Bed#: -01 I Date of Admission: 4/24/2025   Date of Service: 4/25/2025 I Hospital Day: 0    Assessment & Plan  Type 2 diabetes mellitus with hyperglycemia, without long-term current use of insulin ([facility])  Lab Results   Component Value Date    HGBA1C 12.0 (H) 04/24/2025       Recent Labs     04/25/25  0909 04/25/25  1116 04/25/25  1151 04/25/25  1341   POCGLU 334* 382* 339* 363*       Blood Sugar Average: Last 72 hrs:  (P) [id number]  Diagnosed December 2024, placed metformin but reports only took for 1 day due to side effect of abdominal cramping, no follow-up since then  Presenting with blood glucose of 743, gap WNL, bicarb WNL, no indication of DKA at this time  Corrected sodium 138  Initiated on IV insulin drip with protocol in the ED  A1c 12    Blood sugars more receptive this morning so have stopped insulin infusion.  Start Lantus 35 units twice a day and mealtime insulin with Humalog 15 as 3 times a day  Sliding scale insulin for corrections  Patient hesitant to start metformin given previous side effects, we can try several other alternatives.  Patient also oriented about low carbohydrate high-protein diet, increase physical activity, importance of weight loss.  Nutrition consult  Endocrinology consult    Pending blood glucose control may be considered for discharge over the weekend  He will need insulin supplies and medications.  Dizziness  Intermittent for weeks with associated polydipsia and polyuria, denies currently during exam  Most likely in setting of uncontrolled diabetes/hyperglycemia    Agree the previous symptoms likely related to volume depletion.  Seems to be doing slightly better today.  Monitor closely.  Obesity  Discussed importance of weight loss and increase physical activity to better help with blood glucose management.    VTE Pharmacologic Prophylaxis: VTE Score: 2     Mobility:   Basic  Mobility Inpatient Raw Score: 24  JH-HLM Goal: 8: Walk 250 feet or more  JH-HLM Achieved: 7: Walk 25 feet or more  JH-HLM Goal NOT achieved. Continue with multidisciplinary rounding and encourage appropriate mobility to improve upon JH-HLM goals.    Patient Centered Rounds: I performed bedside rounds with nursing staff today.   Discussions with Specialists or Other Care Team Provider: Discussed with care management team    Education and Discussions with Family / Patient:  Patient, he did not request me to talk to anyone.     Current Length of Stay: 0 day(s)  Current Patient Status: Observation   Certification Statement: The patient will continue to require additional inpatient hospital stay due to need for better blood glucose control  Discharge Plan: Anticipate discharge tomorrow to home.    Code Status: Level 1 - Full Code    Subjective     Patient valuated this morning.  His blood glucose is better compared to when he came but not completely optimal yet.  He does mention the polyuria is improving.  Denies any nausea or vomiting.  Does mention the lightheadedness has improved since he arrived.    Objective :  Temp:  [97.8 °F (36.6 °C)-98.8 °F (37.1 °C)] 97.8 °F (36.6 °C)  HR:  [] 89  BP: (113-169)/(74-92) 113/74  Resp:  [18] 18  SpO2:  [93 %-98 %] 93 %  O2 Device: None (Room air)    Body mass index is 39.99 kg/m².     Input and Output Summary (last 24 hours):     Intake/Output Summary (Last 24 hours) at 4/25/2025 1412  Last data filed at 4/25/2025 0515  Gross per 24 hour   Intake 120 ml   Output 1 ml   Net 119 ml       Physical Exam  Vitals and nursing note reviewed.   Constitutional:       General: He is not in acute distress.     Appearance: Normal appearance. He is not ill-appearing, toxic-appearing or diaphoretic.   HENT:      Head: Normocephalic and atraumatic.      Right Ear: External ear normal.      Left Ear: External ear normal.      Nose: Nose normal. No congestion or rhinorrhea.      Mouth/Throat:       Mouth: Mucous membranes are moist.      Pharynx: Oropharynx is clear. No oropharyngeal exudate or posterior oropharyngeal erythema.   Eyes:      General: No scleral icterus.        Right eye: No discharge.         Left eye: No discharge.      Pupils: Pupils are equal, round, and reactive to light.   Neck:      Vascular: No carotid bruit.   Cardiovascular:      Rate and Rhythm: Normal rate and regular rhythm.      Pulses: Normal pulses.      Heart sounds: No murmur heard.     No friction rub. No gallop.   Pulmonary:      Effort: Pulmonary effort is normal. No respiratory distress.      Breath sounds: Normal breath sounds. No stridor. No wheezing, rhonchi or rales.   Abdominal:      General: Abdomen is flat. Bowel sounds are normal. There is no distension.      Palpations: Abdomen is soft. There is no mass.      Tenderness: There is no abdominal tenderness. There is no guarding or rebound.      Hernia: No hernia is present.   Musculoskeletal:         General: No swelling, tenderness, deformity or signs of injury. Normal range of motion.      Cervical back: Normal range of motion. No rigidity. No muscular tenderness.   Lymphadenopathy:      Cervical: No cervical adenopathy.   Skin:     General: Skin is warm and dry.      Capillary Refill: Capillary refill takes less than 2 seconds.      Coloration: Skin is not jaundiced or pale.      Findings: No bruising or erythema.   Neurological:      General: No focal deficit present.      Mental Status: He is alert and oriented to person, place, and time. Mental status is at baseline.      Cranial Nerves: No cranial nerve deficit.      Sensory: No sensory deficit.      Motor: No weakness.      Coordination: Coordination normal.      Deep Tendon Reflexes: Reflexes normal.   Psychiatric:         Mood and Affect: Mood normal.         Behavior: Behavior normal.         Thought Content: Thought content normal.         Judgment: Judgment normal.            Lines/Drains:              Lab Results: I have reviewed the following results:   Results from last 7 days   Lab Units 04/24/25  1936   WBC Thousand/uL 12.61*   HEMOGLOBIN g/dL 16.0   HEMATOCRIT % 44.6   PLATELETS Thousands/uL 286     Results from last 7 days   Lab Units 04/25/25  0500 04/24/25 1936   SODIUM mmol/L 136 128*   POTASSIUM mmol/L 3.2* 4.1   CHLORIDE mmol/L 100 91*   CO2 mmol/L 28 27   BUN mg/dL 8 11   CREATININE mg/dL 0.77 1.14   ANION GAP mmol/L 8 10   CALCIUM mg/dL 9.5 9.9   ALBUMIN g/dL  --  4.4   TOTAL BILIRUBIN mg/dL  --  1.19*   ALK PHOS U/L  --  144*   ALT U/L  --  34   AST U/L  --  28   GLUCOSE RANDOM mg/dL 186* 743*         Results from last 7 days   Lab Units 04/25/25  1341 04/25/25  1151 04/25/25  1116 04/25/25  0909 04/25/25  0656 04/25/25  0502 04/25/25  0304 04/25/25  0102 04/24/25  2306 04/24/25  2221 04/24/25  2146 04/24/25  2051   POC GLUCOSE mg/dl 363* 339* 382* 334* 169* 204* 275* 277* 388* 392* 434* 570*     Results from last 7 days   Lab Units 04/24/25 1936   HEMOGLOBIN A1C % 12.0*           Recent Cultures (last 7 days):               Last 24 Hours Medication List:     Current Facility-Administered Medications:     acetaminophen (TYLENOL) tablet 650 mg, Q6H PRN    aluminum-magnesium hydroxide-simethicone (MAALOX) oral suspension 30 mL, Q6H PRN    atorvastatin (LIPITOR) tablet 40 mg, Daily With Dinner    insulin glargine (LANTUS) subcutaneous injection 35 Units 0.35 mL, Q12H ROSS    insulin lispro (HumALOG/ADMELOG) 100 units/mL subcutaneous injection 1-6 Units, TID AC **AND** Fingerstick Glucose (POCT), TID AC    insulin lispro (HumALOG/ADMELOG) 100 units/mL subcutaneous injection 15 Units, TID With Meals    ondansetron (ZOFRAN) injection 4 mg, Q6H PRN    potassium chloride (Klor-Con M20) CR tablet 40 mEq, BID    Administrative Statements   Today, Patient Was Seen By: Franco Giordano MD      **Please Note: This note may have been constructed using a voice  recognition system.**

## 2025-04-25 NOTE — H&P
H&P - Hospitalist   Name: Param Somers 50 y.o. male I MRN: [id number]  Unit/Bed#: -01 I Date of Admission: 4/24/2025   Date of Service: 4/24/2025 I Hospital Day: 0     Assessment & Plan  Type 2 diabetes mellitus with hyperglycemia, without long-term current use of insulin ([facility])  Lab Results   Component Value Date    HGBA1C 7.5 (H) 12/12/2024       Recent Labs     04/24/25  1904 04/24/25  2051 04/24/25  2146 04/24/25  2221   POCGLU >600* 570* 434* 392*       Blood Sugar Average: Last 72 hrs:  (P) 349  Diagnosed December 2024, placed metformin but reports only took for 1 day due to side effect of abdominal cramping, no follow-up since then  Presenting with blood glucose of 743, gap WNL, bicarb WNL, no indication of DKA at this time  Corrected sodium 138  Initiated on IV insulin drip with protocol in the ED, will continue  Blood glucose checks every 2 hours while on insulin drip, hypoglycemia protocol  Endocrinology consulted as patient does not want to be on metformin for diabetes management  Nutrition consulted at request of patient  Recheck hemoglobin A1c  Dizziness  Intermittent for weeks with associated polydipsia and polyuria, denies currently during exam  Most likely in setting of uncontrolled diabetes/hyperglycemia  See plan under type 2 diabetes mellitus with hyperglycemia      VTE Pharmacologic Prophylaxis: VTE Score: 2 Low Risk (Score 0-2) - Encourage Ambulation.  Code Status: Level 1 - Full Code   Discussion with family: Updated  (wife) at bedside.    Anticipated Length of Stay: Patient will be admitted on an observation basis with an anticipated length of stay of less than 2 midnights secondary to see above.    History of Present Illness   Chief Complaint:    Chief Complaint   Patient presents with    Dizziness     Pt to ER complaining of dizziness, urinary frequency ongoing for months, diarrhea, fatigue. Pt reports this has been ongoing for months but fatigue got worse and dizziness  now present.         Param Somers is a 50 y.o. male with a PMH of diabetes mellitus type 2, hyperlipidemia, obesity who presents with complaints of gradual worsening of dizziness and feeling off balance, polydipsia, polyuria and generalized malaise for about 3 months.  Patient reports he was diagnosed with diabetes in December and was placed metformin, but took for a day and had severe abdominal cramping so he stopped taking it, but never followed up for diabetes management.  He reports mostly he is has severe dry mouth and is constantly thirsty and has been drinking a lot.  He also reports that he has been urinating a lot more than he normally does.  Denies dysuria.  He also reports overall in the past week or more he has felt drained and has felt intermittent dizziness and feeling like he feels off balance if he gets up too quickly from lying down or sitting, especially in the morning when he wakes up.  He currently denies during exam he feels dizzy and overall feels okay now.  He also reports at night he sometimes gets cramping in his legs.  Denies vision change..    Review of Systems   Constitutional:  Positive for fatigue. Negative for activity change.   Eyes:  Negative for visual disturbance.   Respiratory:  Negative for shortness of breath.    Cardiovascular:  Negative for chest pain.   Gastrointestinal:  Negative for abdominal pain.   Endocrine: Positive for polydipsia and polyuria.   Genitourinary:  Negative for dysuria.   Neurological:  Positive for dizziness and headaches.       Historical Information   Past Medical History:   Diagnosis Date    DM (diabetes mellitus) ([facility])     Type 2 diabetes mellitus with hemoglobin A1c goal of less than 7.0% ([facility]) 8/21/2023     Past Surgical History:   Procedure Laterality Date    APPENDECTOMY       Social History     Tobacco Use    Smoking status: Never    Smokeless tobacco: Never   Vaping Use    Vaping status: Never Used   Substance and Sexual Activity    Alcohol use:  Not Currently    Drug use: No    Sexual activity: Not on file     E-Cigarette/Vaping    E-Cigarette Use Never User      E-Cigarette/Vaping Substances    Nicotine No     THC No     CBD No     Flavoring No     Other No     Unknown No      Family history non-contributory  Social History:  Marital Status: Single     Patient Pre-hospital Living Situation: Home  Patient Pre-hospital Level of Mobility: walks  Patient Pre-hospital Diet Restrictions: n/a    Meds/Allergies   I reviewed her medications per review of chart.   Prior to Admission medications    Medication Sig Start Date End Date Taking? Authorizing Provider   albuterol (PROVENTIL HFA,VENTOLIN HFA) 90 mcg/act inhaler Inhale 2 puffs every 4 (four) hours as needed for wheezing or shortness of breath  Patient not taking: Reported on 4/24/2025 1/15/22   Chastity Beal PA-C   benzonatate (TESSALON PERLES) 100 mg capsule Take 1 capsule (100 mg total) by mouth 3 (three) times a day as needed for cough  Patient not taking: Reported on 4/24/2025 1/15/22   Chastity Beal PA-C   metFORMIN (GLUCOPHAGE-XR) 500 mg 24 hr tablet Take 1 tab daily for 2 weeks then increase to 2 tabs daily  to continue  Patient not taking: Reported on 4/24/2025 12/18/24   Historical Provider, MD   rosuvastatin (CRESTOR) 20 MG tablet Take 20 mg by mouth daily  Patient not taking: Reported on 4/24/2025 12/18/24   Historical Provider, MD     No Known Allergies    Objective :  Temp:  [97.9 °F (36.6 °C)] 97.9 °F (36.6 °C)  HR:  [102] 102  BP: (169)/(92) 169/92  Resp:  [18] 18  SpO2:  [98 %] 98 %  O2 Device: None (Room air)    Physical Exam  Vitals and nursing note reviewed.   Constitutional:       Appearance: Normal appearance. He is not toxic-appearing.   HENT:      Head: Normocephalic.   Cardiovascular:      Rate and Rhythm: Normal rate and regular rhythm.   Pulmonary:      Effort: Pulmonary effort is normal. No respiratory distress.      Breath sounds: Normal breath sounds.   Abdominal:       General: Abdomen is flat. Bowel sounds are normal.      Palpations: Abdomen is soft.      Tenderness: There is no abdominal tenderness.   Musculoskeletal:      Right lower leg: No edema.      Left lower leg: No edema.   Skin:     General: Skin is warm and dry.   Neurological:      General: No focal deficit present.      Mental Status: He is alert and oriented to person, place, and time.   Psychiatric:         Mood and Affect: Mood normal.         Behavior: Behavior normal.         Thought Content: Thought content normal.          Lines/Drains:            Lab Results: I have reviewed the following results:  Results from last 7 days   Lab Units 04/24/25  1936   WBC Thousand/uL 12.61*   HEMOGLOBIN g/dL 16.0   HEMATOCRIT % 44.6   PLATELETS Thousands/uL 286     Results from last 7 days   Lab Units 04/24/25  1936   SODIUM mmol/L 128*   POTASSIUM mmol/L 4.1   CHLORIDE mmol/L 91*   CO2 mmol/L 27   BUN mg/dL 11   CREATININE mg/dL 1.14   ANION GAP mmol/L 10   CALCIUM mg/dL 9.9   ALBUMIN g/dL 4.4   TOTAL BILIRUBIN mg/dL 1.19*   ALK PHOS U/L 144*   ALT U/L 34   AST U/L 28   GLUCOSE RANDOM mg/dL 743*         Results from last 7 days   Lab Units 04/24/25  2221 04/24/25  2146 04/24/25  2051 04/24/25  1904   POC GLUCOSE mg/dl 392* 434* 570* >600*     Lab Results   Component Value Date    HGBA1C 7.5 (H) 12/12/2024    HGBA1C 6.6 (H) 08/21/2023    HGBA1C 7.3 (H) 01/26/2022           Imaging Results Review: I reviewed radiology reports from this admission including: chest xray.  Other Study Results Review: EKG was personally reviewed and my interpretation is: Personally Reviewed. NSR..    Administrative Statements   I have spent a total time of 75 minutes in caring for this patient on the day of the visit/encounter including Diagnostic results, Prognosis, Patient and family education, Importance of tx compliance, Risk factor reductions, Impressions, Counseling / Coordination of care, Documenting in the medical record, Reviewing/placing  orders in the medical record (including tests, medications, and/or procedures), and Obtaining or reviewing history  .    ** Please Note: This note has been constructed using a voice recognition system. **

## 2025-04-25 NOTE — UTILIZATION REVIEW
Initial Clinical Review    Admission: Date/Time/Statement:   Admission Orders (From admission, onward)       Ordered        04/24/25 2131  Place in Observation  Once                          Orders Placed This Encounter   Procedures    Place in Observation     Standing Status:   Standing     Number of Occurrences:   1     Level of Care:   Med Surg [16]     ED Arrival Information       Expected   -    Arrival   4/24/2025 18:57    Acuity   Urgent              Means of arrival   Walk-In    Escorted by   Spouse    Service   Hospitalist    Admission type   Emergency              Arrival complaint   Dizziness, Frequent Urination             Chief Complaint   Patient presents with    Dizziness     Pt to ER complaining of dizziness, urinary frequency ongoing for months, diarrhea, fatigue. Pt reports this has been ongoing for months but fatigue got worse and dizziness now present.        Initial Presentation: 50 y.o. male to ED from home w/  PMH of diabetes mellitus type 2, hyperlipidemia, obesity who presents with complaints of gradual worsening of dizziness and feeling off balance, polydipsia, polyuria and generalized malaise for about 3 month .  diagnosed with diabetes in December and was placed metformin, but took for a day and had severe abdominal cramping so he stopped taking it, but never followed up for diabetes management. He reports mostly he is has severe dry mouth and is constantly thirsty and has been drinking a lot. He also reports that he has been urinating a lot more than he normally does. BG >600 - 392. Started on insulin gtt in ED . Admitted OBS status w/ DM plan BG q2hr while on insulin gtt , endocrinology consult , recheck A1c . Dizziness associated polydipsia and polyuria .    Anticipated Length of Stay/Certification Statement: Patient will be admitted on an observation basis with an anticipated length of stay of less than 2 midnights secondary to see above.     4/24 Endocrinology Consult   Type 2  diabetes with hyperglycemia: A1c has significantly increased to 12. Recommended the patient that would recommend insulin therapy at this time. Will adjust his inpatient regimen to Lantus 35 units twice daily and Humalog to 15 units with meals and continue scale for correction. Will obtain a.m. C-peptide level fasting. was started on IV insulin and IV fluids and overall is feeling better. This morning he was transition to subcutaneous insulin with a regimen of Lantus 30 units twice daily and Humalog 10 units with meals plus scale.     ED Treatment-Medication Administration from 04/24/2025 1857 to 04/24/2025 2207         Date/Time Order Dose Route Action     04/24/2025 1939 sodium chloride 0.9 % infusion 1,000 mL/hr Intravenous New Bag     04/24/2025 2050 insulin regular (HumuLIN R,NovoLIN R) 1 Units/mL in sodium chloride 0.9 % 100 mL infusion 10 Units/hr Intravenous New Bag            Scheduled Medications:  atorvastatin, 40 mg, Oral, Daily With Dinner  insulin glargine, 35 Units, Subcutaneous, Q12H ROSS  insulin lispro, 1-6 Units, Subcutaneous, TID AC  insulin lispro, 15 Units, Subcutaneous, TID With Meals  potassium chloride, 40 mEq, Oral, BID      Continuous IV Infusions:     PRN Meds:  acetaminophen, 650 mg, Oral, Q6H PRN  aluminum-magnesium hydroxide-simethicone, 30 mL, Oral, Q6H PRN  ondansetron, 4 mg, Intravenous, Q6H PRN      ED Triage Vitals   Temperature Pulse Respirations Blood Pressure SpO2 Pain Score   04/24/25 1900 04/24/25 1900 04/24/25 1900 04/24/25 1900 04/24/25 1900 04/24/25 1902   97.9 °F (36.6 °C) 102 18 169/92 98 % No Pain     Weight (last 2 days)       Date/Time Weight    04/24/25 22:28:24 109 (240.3)    04/24/25 2100 109 (240.3)    04/24/25 1900 109 (240.08)            Vital Signs (last 3 days)       Date/Time Temp Pulse Resp BP MAP (mmHg) SpO2 O2 Device Patient Position - Orthostatic VS Pain    04/25/25 0900 -- -- -- -- -- -- -- -- No Pain    04/25/25 08:13:08 97.8 °F (36.6 °C) 89 -- 113/74 87  93 % -- -- --    04/24/25 22:28:24 98.8 °F (37.1 °C) 97 18 124/88 100 93 % None (Room air) Sitting --    04/24/25 2215 -- -- -- -- -- -- -- -- No Pain    04/24/25 2159 -- -- -- -- -- -- None (Room air) -- --    04/24/25 1902 -- -- -- -- -- -- -- -- No Pain    04/24/25 1900 97.9 °F (36.6 °C) 102 18 169/92 122 98 % None (Room air) Sitting --              Pertinent Labs/Diagnostic Test Results:   Radiology:  XR chest 2 views   ED Interpretation by Ale Mccullough PA-C (04/24 2118)   No acute cardiopulmonary disease.        Final Interpretation by Cassidy Katz MD (04/25 0545)      No acute cardiopulmonary disease.            Workstation performed: [id number]           Cardiology:  ECG 12 lead    by Interface, Ris Results In (04/24 1912)        GI:  No orders to display           Results from last 7 days   Lab Units 04/24/25 1936   WBC Thousand/uL 12.61*   HEMOGLOBIN g/dL 16.0   HEMATOCRIT % 44.6   PLATELETS Thousands/uL 286         Results from last 7 days   Lab Units 04/25/25  0500 04/24/25  1936   SODIUM mmol/L 136 128*   POTASSIUM mmol/L 3.2* 4.1   CHLORIDE mmol/L 100 91*   CO2 mmol/L 28 27   ANION GAP mmol/L 8 10   BUN mg/dL 8 11   CREATININE mg/dL 0.77 1.14   EGFR ml/min/1.73sq m 105 74   CALCIUM mg/dL 9.5 9.9     Results from last 7 days   Lab Units 04/24/25  1936   AST U/L 28   ALT U/L 34   ALK PHOS U/L 144*   TOTAL PROTEIN g/dL 7.8   ALBUMIN g/dL 4.4   TOTAL BILIRUBIN mg/dL 1.19*     Results from last 7 days   Lab Units 04/25/25  1151 04/25/25  1116 04/25/25  0909 04/25/25  0656 04/25/25  0502 04/25/25  0304 04/25/25  0102 04/24/25  2306 04/24/25  2221 04/24/25  2146 04/24/25  2051 04/24/25  1904   POC GLUCOSE mg/dl 339* 382* 334* 169* 204* 275* 277* 388* 392* 434* 570* >600*     Results from last 7 days   Lab Units 04/25/25  0500 04/24/25  1936   GLUCOSE RANDOM mg/dL 186* 743*         Results from last 7 days   Lab Units 04/24/25  1936   HEMOGLOBIN A1C % 12.0*   EAG mg/dl 298     Beta- Hydroxybutyrate    Date Value Ref Range Status   04/24/2025 0.06 0.02 - 0.27 mmol/L Final          Results from last 7 days   Lab Units 04/24/25 1936   PH FRANCISCO  7.385   PCO2 FRANCISCO mm Hg 48.9   PO2 FRANCISCO mm Hg 54.3*   HCO3 FRANCISCO mmol/L 28.6   BASE EXC FRANCISCO mmol/L 2.5   O2 CONTENT FRANCISCO ml/dL 21.4   O2 HGB, VENOUS % 87.3*       Results from last 7 days   Lab Units 04/24/25 1936   CLARITY UA  Clear   COLOR UA  Colorless   SPEC GRAV UA  1.035*   PH UA  5.5   GLUCOSE UA mg/dl >=1000 (1%)*   KETONES UA mg/dl Negative   BLOOD UA  Negative   PROTEIN UA mg/dl Negative   NITRITE UA  Negative   BILIRUBIN UA  Negative   UROBILINOGEN UA (BE) mg/dl <2.0   LEUKOCYTES UA  Elevated glucose may cause decreased leukocyte values. See urine microscopic for UWBC result*   WBC UA /hpf 1-2   RBC UA /hpf 1-2   BACTERIA UA /hpf None Seen   EPITHELIAL CELLS WET PREP /hpf Occasional     Past Medical History:   Diagnosis Date    DM (diabetes mellitus) ([facility])     Type 2 diabetes mellitus with hemoglobin A1c goal of less than 7.0% ([facility]) 8/21/2023     Present on Admission:  **None**      Admitting Diagnosis: Polydipsia [R63.1]  Polyuria [R35.89]  Dizziness [R42]  Hyperglycemia [R73.9]  Age/Sex: 50 y.o. male    Network Utilization Review Department  ATTENTION: Please call with any questions or concerns to [phone] and carefully listen to the prompts so that you are directed to the right person. All voicemails are confidential.   For Discharge needs, contact Care Management DC Support Team at [phone] opt. [phone]  Send all requests for admission clinical reviews, approved or denied determinations and any other requests to dedicated fax number below belonging to the campus where the patient is receiving treatment. List of dedicated fax numbers for the Facilities:  FACILITY NAME UR FAX NUMBER   ADMISSION DENIALS (Administrative/Medical Necessity) [phone]   DISCHARGE SUPPORT TEAM (NETWORK) [phone]   PARENT CHILD HEALTH (Maternity/NICU/Pediatrics) [phone]   [facility]  [facility] [phone]   [facility] [phone]   [facility] [phone]   [facility] [phone]   [facility] [phone]   [facility] [phone]   [facility] [phone]   [facility] [phone]   [facility] [phone]   Critical access hospital [phone]   [facility] [phone]   [facility] [phone]

## 2025-04-25 NOTE — ASSESSMENT & PLAN NOTE
Intermittent for weeks with associated polydipsia and polyuria, denies currently during exam  Most likely in setting of uncontrolled diabetes/hyperglycemia    Agree the previous symptoms likely related to volume depletion.  Seems to be doing slightly better today.  Monitor closely.

## 2025-04-25 NOTE — PLAN OF CARE
Problem: Nutrition/Hydration-ADULT  Goal: Nutrient/Hydration intake appropriate for improving, restoring or maintaining nutritional needs  Description: Monitor and assess patient's nutrition/hydration status for malnutrition. Collaborate with interdisciplinary team and initiate plan and interventions as ordered.  Monitor patient's weight and dietary intake as ordered or per policy. Utilize nutrition screening tool and intervene as necessary. Determine patient's food preferences and provide high-protein, high-caloric foods as appropriate. INTERVENTIONS:- Monitor oral intake, urinary output, labs, and treatment plans- Assess nutrition and hydration status and recommend course of action- Evaluate amount of meals eaten- Assist patient with eating if necessary - Allow adequate time for meals- Recommend/ encourage appropriate diets, oral nutritional supplements, and vitamin/mineral supplements- Order, calculate, and assess calorie counts as needed- Recommend, monitor, and adjust tube feedings and TPN/PPN based on assessed needs- Assess need for intravenous fluids- Provide specific nutrition/hydration education as appropriate- Include patient/family/caregiver in decisions related to nutrition  Outcome: Progressing     Problem: SAFETY ADULT  Goal: Maintain or return to baseline ADL function  Description: INTERVENTIONS:-  Assess patient's ability to carry out ADLs; assess patient's baseline for ADL function and identify physical deficits which impact ability to perform ADLs (bathing, care of mouth/teeth, toileting, grooming, dressing, etc.)- Assess/evaluate cause of self-care deficits - Assess range of motion- Assess patient's mobility; develop plan if impaired- Assess patient's need for assistive devices and provide as appropriate- Encourage maximum independence but intervene and supervise when necessary- Involve family in performance of ADLs- Assess for home care needs following discharge - Consider OT consult to assist  with ADL evaluation and planning for discharge- Provide patient education as appropriate  Outcome: Progressing     Problem: DISCHARGE PLANNING  Goal: Discharge to home or other facility with appropriate resources  Description: INTERVENTIONS:- Identify barriers to discharge w/patient and caregiver- Arrange for needed discharge resources and transportation as appropriate- Identify discharge learning needs (meds, wound care, etc.)- Arrange for interpretive services to assist at discharge as needed- Refer to Case Management Department for coordinating discharge planning if the patient needs post-hospital services based on physician/advanced practitioner order or complex needs related to functional status, cognitive ability, or social support system  Outcome: Progressing     Problem: METABOLIC, FLUID AND ELECTROLYTES - ADULT  Goal: Glucose maintained within target range  Description: INTERVENTIONS:- Monitor Blood Glucose as ordered- Assess for signs and symptoms of hyperglycemia and hypoglycemia- Administer ordered medications to maintain glucose within target range- Assess nutritional intake and initiate nutrition service referral as needed  Outcome: Progressing

## 2025-04-25 NOTE — CONSULTS
Preeti - Param Yonis 50 y.o. male MRN: [id number]    Unit/Bed#: -01 Encounter: [id number]      Assessment & Plan     Assessment/Plan:  1.  Type 2 diabetes with hyperglycemia: A1c has significantly increased to 12.  Recommended the patient that would recommend insulin therapy at this time.  Will adjust his inpatient regimen to Lantus 35 units twice daily and Humalog to 15 units with meals and continue scale for correction.  Will obtain a.m. C-peptide level fasting.  Recommended follow-up appointment with endocrinology as an outpatient.    Upon discharge, patient will need a prescription for insulin pens, insulin pen needles, glucometer, test strips, lancets.    Upon discharge, if Lantus is not the preferred basal insulin for the patient's insurance company, we can use Tresiba, Semglee, Basaglar, Levemir, Toujeo at the same dose instead.    Upon discharge, if Humalog is not the preferred mealtime insulin for the patient's insurance, we can use NovoLog, Fiasp, Admelog, Lyumjiev or Apidra at the same dose instead.      Administrative Statements   VIRTUAL CARE DOCUMENTATION:     1. This service was provided via Telemedicine using Teams Virtual Rounding      2. Parties in the room with patient during teleconsult Family member: and patient     3. Confidentiality My office door was closed     4. Participants No one else was in the room    5. Patient acknowledged consent and understanding of privacy and security of the  Telemedicine consult. I informed the patient that I have reviewed their record in Epic and presented the opportunity for them to ask any questions regarding the visit today.  The patient agreed to participate.    6. I have spent a total time of 25 minutes in caring for this patient on the day of the visit/encounter including Diagnostic results, Instructions for management, Patient and family education, Risk factor reductions, Impressions, Counseling / Coordination of care, Documenting in the  medical record, Reviewing/placing orders in the medical record (including tests, medications, and/or procedures), and Obtaining or reviewing history  , not including the time spent for establishing the audio/video connection.         CC: Diabetes Consult    History of Present Illness     HPI: Param Somers is a 50 y.o. year old male with type 2 diabetes which reportedly was diagnosed in December.  Patient was tried on metformin but discontinued due to abdominal discomfort.  He presents today with lightheadedness, dizziness, polyuria, polydipsia and was found to have a significant elevation in blood sugar and A1c.  He reports a family history of diabetes in his mother and grandmother.  Denies any recent corticosteroids.  No reported history of pancreatitis.  He was started on IV insulin and IV fluids and overall is feeling better.  This morning he was transition to subcutaneous insulin with a regimen of Lantus 30 units twice daily and Humalog 10 units with meals plus scale.  Reports appetite is good.  No known diabetes complications at this time.     Inpatient consult to Endocrinology  Consult performed by: Yang Espana DO  Consult ordered by: Traci Wallace PA-C          Review of Systems   All other systems reviewed and are negative.      Historical Information   Past Medical History:   Diagnosis Date    DM (diabetes mellitus) ([facility])     Type 2 diabetes mellitus with hemoglobin A1c goal of less than 7.0% ([facility]) 8/21/2023     Past Surgical History:   Procedure Laterality Date    APPENDECTOMY       Social History   Social History     Substance and Sexual Activity   Alcohol Use Not Currently     Social History     Substance and Sexual Activity   Drug Use No     Social History     Tobacco Use   Smoking Status Never   Smokeless Tobacco Never     Family History:   Family History   Problem Relation Age of Onset    Cancer Mother     Cancer Father        Meds/Allergies   Current Facility-Administered Medications  "  Medication Dose Route Frequency Provider Last Rate Last Admin    acetaminophen (TYLENOL) tablet 650 mg  650 mg Oral Q6H PRN Traci Wallace PA-C        aluminum-magnesium hydroxide-simethicone (MAALOX) oral suspension 30 mL  30 mL Oral Q6H PRN Traci Wallace PA-C        atorvastatin (LIPITOR) tablet 40 mg  40 mg Oral Daily With Dinner Traci Wallace PA-C        insulin glargine (LANTUS) subcutaneous injection 30 Units 0.3 mL  30 Units Subcutaneous Q12H [facility] Franco Flores MD   30 Units at 04/25/25 0910    insulin lispro (HumALOG/ADMELOG) 100 units/mL subcutaneous injection 1-6 Units  1-6 Units Subcutaneous TID AC Franco Flores MD        insulin lispro (HumALOG/ADMELOG) 100 units/mL subcutaneous injection 15 Units  15 Units Subcutaneous TID With Meals Franco Flores MD        ondansetron (ZOFRAN) injection 4 mg  4 mg Intravenous Q6H PRN Traci Wallace PA-C        potassium chloride (Klor-Con M20) CR tablet 40 mEq  40 mEq Oral BID Franco Flores MD   40 mEq at 04/25/25 0910     No Known Allergies    Objective   Vitals: Blood pressure 113/74, pulse 89, temperature 97.8 °F (36.6 °C), resp. rate 18, height 5' 5\" (1.651 m), weight 109 kg (240 lb 4.8 oz), SpO2 93%.    Intake/Output Summary (Last 24 hours) at 4/25/2025 1211  Last data filed at 4/25/2025 0515  Gross per 24 hour   Intake 120 ml   Output 1 ml   Net 119 ml     Invasive Devices       Peripheral Intravenous Line  Duration             Peripheral IV 04/24/25 Left Forearm <1 day    Peripheral IV 04/24/25 Right Antecubital <1 day                    Physical Exam  Vitals reviewed.     Physical Exam:  General: No acute distress. Alert and awake.  HEENT: Normocephalic, atraumatic.  Cardiac: No lower extremity edema.  Pulmonary: No respiratory distress.  Neuro: Moves all extremities spontaneously.        The history was obtained from the review of the chart, patient.    Lab Results:   Results from last 7 days   Lab Units " "04/24/25 1936   HEMOGLOBIN A1C % 12.0*     Lab Results   Component Value Date    WBC 12.61 (H) 04/24/2025    HGB 16.0 04/24/2025    HCT 44.6 04/24/2025    MCV 87 04/24/2025     04/24/2025     Lab Results   Component Value Date/Time    BUN 8 04/25/2025 05:00 AM    BUN 9 04/18/2025 10:33 AM    K 3.2 (L) 04/25/2025 05:00 AM    K 4.1 04/18/2025 10:33 AM     04/25/2025 05:00 AM    CL 94 (L) 04/18/2025 10:33 AM    CO2 28 04/25/2025 05:00 AM    CO2 29 04/18/2025 10:33 AM    CREATININE 0.77 04/25/2025 05:00 AM    CREATININE 1.02 04/18/2025 10:33 AM    AST 28 04/24/2025 07:36 PM    AST 17 08/21/2023 10:21 AM    ALT 34 04/24/2025 07:36 PM    ALT 27 08/21/2023 10:21 AM    TP 7.8 04/24/2025 07:36 PM    TP 7.4 08/21/2023 10:21 AM    ALB 4.4 04/24/2025 07:36 PM    ALB 4.3 08/21/2023 10:21 AM     No results for input(s): \"CHOL\", \"HDL\", \"LDL\", \"TRIG\", \"VLDL\" in the last 72 hours.  No results found for: \"MICROALBUR\", \"[id number]\"  POC Glucose (mg/dl)   Date Value   04/25/2025 339 (H)   04/25/2025 382 (H)   04/25/2025 334 (H)   04/25/2025 169 (H)   04/25/2025 204 (H)   04/25/2025 275 (H)   04/25/2025 277 (H)   04/24/2025 388 (H)   04/24/2025 392 (H)   04/24/2025 434 (HH)       Imaging Studies: Results Review Statement: I personally reviewed the following image studies/reports in PACS and discussed pertinent findings with Radiology: chest xray. My interpretation of the radiology images/reports is: No acute disease.    Portions of the record may have been created with voice recognition software. Occasional wrong word or \"sound a like\" substitutions may have occurred due to the inherent limitations of voice recognition software. Read the chart carefully and recognize, using context, where substitutions have occurred.    "

## 2025-04-26 LAB
ALBUMIN SERPL BCG-MCNC: 3.7 G/DL (ref 3.5–5)
ALP SERPL-CCNC: 106 U/L (ref 34–104)
ALT SERPL W P-5'-P-CCNC: 38 U/L (ref 7–52)
ANION GAP SERPL CALCULATED.3IONS-SCNC: 6 MMOL/L (ref 4–13)
AST SERPL W P-5'-P-CCNC: 34 U/L (ref 13–39)
BASOPHILS # BLD AUTO: 0.03 THOUSANDS/ÂΜL (ref 0–0.1)
BASOPHILS NFR BLD AUTO: 0 % (ref 0–1)
BILIRUB SERPL-MCNC: 1.03 MG/DL (ref 0.2–1)
BUN SERPL-MCNC: 10 MG/DL (ref 5–25)
CALCIUM SERPL-MCNC: 8.8 MG/DL (ref 8.4–10.2)
CHLORIDE SERPL-SCNC: 100 MMOL/L (ref 96–108)
CO2 SERPL-SCNC: 26 MMOL/L (ref 21–32)
CREAT SERPL-MCNC: 0.77 MG/DL (ref 0.6–1.3)
EOSINOPHIL # BLD AUTO: 0.22 THOUSAND/ÂΜL (ref 0–0.61)
EOSINOPHIL NFR BLD AUTO: 2 % (ref 0–6)
ERYTHROCYTE [DISTWIDTH] IN BLOOD BY AUTOMATED COUNT: 12.5 % (ref 11.6–15.1)
GFR SERPL CREATININE-BSD FRML MDRD: 105 ML/MIN/1.73SQ M
GLUCOSE P FAST SERPL-MCNC: 376 MG/DL (ref 65–99)
GLUCOSE SERPL-MCNC: 162 MG/DL (ref 65–140)
GLUCOSE SERPL-MCNC: 250 MG/DL (ref 65–140)
GLUCOSE SERPL-MCNC: 370 MG/DL (ref 65–140)
GLUCOSE SERPL-MCNC: 372 MG/DL (ref 65–140)
GLUCOSE SERPL-MCNC: 376 MG/DL (ref 65–140)
GLUCOSE SERPL-MCNC: 425 MG/DL (ref 65–140)
HCT VFR BLD AUTO: 46.1 % (ref 36.5–49.3)
HGB BLD-MCNC: 15.6 G/DL (ref 12–17)
IMM GRANULOCYTES # BLD AUTO: 0.04 THOUSAND/UL (ref 0–0.2)
IMM GRANULOCYTES NFR BLD AUTO: 0 % (ref 0–2)
LYMPHOCYTES # BLD AUTO: 2.88 THOUSANDS/ÂΜL (ref 0.6–4.47)
LYMPHOCYTES NFR BLD AUTO: 30 % (ref 14–44)
MCH RBC QN AUTO: 30.8 PG (ref 26.8–34.3)
MCHC RBC AUTO-ENTMCNC: 33.8 G/DL (ref 31.4–37.4)
MCV RBC AUTO: 91 FL (ref 82–98)
MONOCYTES # BLD AUTO: 0.57 THOUSAND/ÂΜL (ref 0.17–1.22)
MONOCYTES NFR BLD AUTO: 6 % (ref 4–12)
NEUTROPHILS # BLD AUTO: 5.99 THOUSANDS/ÂΜL (ref 1.85–7.62)
NEUTS SEG NFR BLD AUTO: 62 % (ref 43–75)
NRBC BLD AUTO-RTO: 0 /100 WBCS
PLATELET # BLD AUTO: 262 THOUSANDS/UL (ref 149–390)
PMV BLD AUTO: 10.5 FL (ref 8.9–12.7)
POTASSIUM SERPL-SCNC: 4 MMOL/L (ref 3.5–5.3)
PROT SERPL-MCNC: 6.7 G/DL (ref 6.4–8.4)
RBC # BLD AUTO: 5.07 MILLION/UL (ref 3.88–5.62)
SODIUM SERPL-SCNC: 132 MMOL/L (ref 135–147)
WBC # BLD AUTO: 9.73 THOUSAND/UL (ref 4.31–10.16)

## 2025-04-26 PROCEDURE — 80053 COMPREHEN METABOLIC PANEL: CPT | Performed by: INTERNAL MEDICINE

## 2025-04-26 PROCEDURE — 82948 REAGENT STRIP/BLOOD GLUCOSE: CPT

## 2025-04-26 PROCEDURE — 84681 ASSAY OF C-PEPTIDE: CPT | Performed by: INTERNAL MEDICINE

## 2025-04-26 PROCEDURE — 85025 COMPLETE CBC W/AUTO DIFF WBC: CPT | Performed by: INTERNAL MEDICINE

## 2025-04-26 PROCEDURE — 99233 SBSQ HOSP IP/OBS HIGH 50: CPT | Performed by: INTERNAL MEDICINE

## 2025-04-26 RX ORDER — INSULIN LISPRO 100 [IU]/ML
4-20 INJECTION, SOLUTION INTRAVENOUS; SUBCUTANEOUS
Status: DISCONTINUED | OUTPATIENT
Start: 2025-04-26 | End: 2025-04-27

## 2025-04-26 RX ORDER — INSULIN GLARGINE 100 [IU]/ML
60 INJECTION, SOLUTION SUBCUTANEOUS EVERY 12 HOURS SCHEDULED
Status: DISCONTINUED | OUTPATIENT
Start: 2025-04-26 | End: 2025-04-27

## 2025-04-26 RX ORDER — INSULIN LISPRO 100 [IU]/ML
30 INJECTION, SOLUTION INTRAVENOUS; SUBCUTANEOUS
Status: DISCONTINUED | OUTPATIENT
Start: 2025-04-26 | End: 2025-04-27

## 2025-04-26 RX ORDER — INSULIN LISPRO 100 [IU]/ML
20 INJECTION, SOLUTION INTRAVENOUS; SUBCUTANEOUS
Status: DISCONTINUED | OUTPATIENT
Start: 2025-04-26 | End: 2025-04-26

## 2025-04-26 RX ORDER — INSULIN GLARGINE 100 [IU]/ML
45 INJECTION, SOLUTION SUBCUTANEOUS EVERY 12 HOURS SCHEDULED
Status: DISCONTINUED | OUTPATIENT
Start: 2025-04-26 | End: 2025-04-26

## 2025-04-26 RX ADMIN — ATORVASTATIN CALCIUM 40 MG: 40 TABLET, FILM COATED ORAL at 16:09

## 2025-04-26 RX ADMIN — INSULIN LISPRO 15 UNITS: 100 INJECTION, SOLUTION INTRAVENOUS; SUBCUTANEOUS at 08:30

## 2025-04-26 RX ADMIN — INSULIN LISPRO 8 UNITS: 100 INJECTION, SOLUTION INTRAVENOUS; SUBCUTANEOUS at 16:07

## 2025-04-26 RX ADMIN — INSULIN LISPRO 6 UNITS: 100 INJECTION, SOLUTION INTRAVENOUS; SUBCUTANEOUS at 11:30

## 2025-04-26 RX ADMIN — INSULIN GLARGINE 60 UNITS: 100 INJECTION, SOLUTION SUBCUTANEOUS at 20:57

## 2025-04-26 RX ADMIN — INSULIN LISPRO 6 UNITS: 100 INJECTION, SOLUTION INTRAVENOUS; SUBCUTANEOUS at 08:44

## 2025-04-26 RX ADMIN — Medication 6 MG: at 21:37

## 2025-04-26 RX ADMIN — INSULIN LISPRO 20 UNITS: 100 INJECTION, SOLUTION INTRAVENOUS; SUBCUTANEOUS at 13:46

## 2025-04-26 RX ADMIN — INSULIN LISPRO 20 UNITS: 100 INJECTION, SOLUTION INTRAVENOUS; SUBCUTANEOUS at 11:30

## 2025-04-26 RX ADMIN — INSULIN GLARGINE 45 UNITS: 100 INJECTION, SOLUTION SUBCUTANEOUS at 08:44

## 2025-04-26 RX ADMIN — INSULIN LISPRO 30 UNITS: 100 INJECTION, SOLUTION INTRAVENOUS; SUBCUTANEOUS at 16:07

## 2025-04-26 NOTE — ASSESSMENT & PLAN NOTE
Lab Results   Component Value Date    HGBA1C 12.0 (H) 04/24/2025       Recent Labs     04/25/25  1656 04/25/25  2049 04/26/25  0725 04/26/25  1136   POCGLU 354* 332* 372* 370*       Blood Sugar Average: Last 72 hrs:  (P) [id number]  Diagnosed December 2024, placed metformin but reports only took for 1 day due to side effect of abdominal cramping, no follow-up since then  Presenting with blood glucose of 743, gap WNL, bicarb WNL, no indication of DKA at this time  Corrected sodium 138  Initiated on IV insulin drip with protocol in the ED eventually discontinued.  A1c 12    Blood sugars are still uncontrolled with current regimen.    Increased Lantus to 60U BID and Humolog with meals 30 TID WM.  Increase algorithm for sliding scale.  If no significant improvement with current regimen may need addition of U-500 instead.  Continue to monitor blood glucose  Given persistent elevated blood glucose noted stable for discharge at this point.  Monitor

## 2025-04-26 NOTE — ASSESSMENT & PLAN NOTE
Discussed importance of weight loss and increase physical activity to better help with blood glucose management.  Dietary modifications recommended

## 2025-04-26 NOTE — PLAN OF CARE
Problem: SAFETY ADULT  Goal: Maintain or return to baseline ADL function  Description: INTERVENTIONS:-  Assess patient's ability to carry out ADLs; assess patient's baseline for ADL function and identify physical deficits which impact ability to perform ADLs (bathing, care of mouth/teeth, toileting, grooming, dressing, etc.)- Assess/evaluate cause of self-care deficits - Assess range of motion- Assess patient's mobility; develop plan if impaired- Assess patient's need for assistive devices and provide as appropriate- Encourage maximum independence but intervene and supervise when necessary- Involve family in performance of ADLs- Assess for home care needs following discharge - Consider OT consult to assist with ADL evaluation and planning for discharge- Provide patient education as appropriate  Outcome: Progressing     Problem: DISCHARGE PLANNING  Goal: Discharge to home or other facility with appropriate resources  Description: INTERVENTIONS:- Identify barriers to discharge w/patient and caregiver- Arrange for needed discharge resources and transportation as appropriate- Identify discharge learning needs (meds, wound care, etc.)- Arrange for interpretive services to assist at discharge as needed- Refer to Case Management Department for coordinating discharge planning if the patient needs post-hospital services based on physician/advanced practitioner order or complex needs related to functional status, cognitive ability, or social support system  Outcome: Progressing     Problem: Knowledge Deficit  Goal: Patient/family/caregiver demonstrates understanding of disease process, treatment plan, medications, and discharge instructions  Description: Complete learning assessment and assess knowledge base.Interventions:- Provide teaching at level of understanding- Provide teaching via preferred learning methods  Outcome: Progressing     Problem: METABOLIC, FLUID AND ELECTROLYTES - ADULT  Goal: Glucose maintained within  target range  Description: INTERVENTIONS:- Monitor Blood Glucose as ordered- Assess for signs and symptoms of hyperglycemia and hypoglycemia- Administer ordered medications to maintain glucose within target range- Assess nutritional intake and initiate nutrition service referral as needed  Outcome: Progressing

## 2025-04-26 NOTE — UTILIZATION REVIEW
SEE INITIAL REVIEW AT BOTTOM    OBSERVATION 4/24 CHANGED TO INPATIENT ON 4/26 @ 1409    Admission Orders (From admission, onward)       Ordered        04/26/25 1409  INPATIENT ADMISSION  Once            04/24/25 2131  Place in Observation  Once                     Orders Placed This Encounter   Procedures    INPATIENT ADMISSION     Standing Status:   Standing     Number of Occurrences:   1     Level of Care:   Med Surg [16]     Estimated length of stay:   More than 2 Midnights     Certification:   I certify that inpatient services are medically necessary for this patient for a duration of greater than two midnights. See H&P and MD Progress Notes for additional information about the patient's course of treatment.     Continued Stay Review    Date: 4/26  CHANGED TO INPATIENT STATUS TODAY                   Current Patient Class: Inpatient  Current Level of Care: MS    HPI:50 y.o. male initially admitted on OBS ON 4/24, IP 4/26  Current Diagnosis: DM plan on insulin gtt , recheck A1c . Dizziness associated polydipsia and polyuria .     Assessment/Plan:   Off insulin drip on 4/25.  Glucose still uncontrolled on current regimen.  Increasing Lantus to 60 u BID and Humalog w/ meals 30u TID. Increase algorithm for SSI cover, if not improving may need U500.  Pt notes feeling slightly better today, but notes fatigue.  No N/V, no abd complaints. A&O x 3.  POC glucose running mid 300s today.  No leukocytosis. Has low , AM glucose 376 today.     Date: 4/27 Day 2 IP:   DM - continues with uncontrolled at this time but improving.  Insulin adjustment continues. May need U 500 insulin.  Dizziness slightly improved today.  No c/o CP, vomiting today, is fatigued.  Glucose in mid 200s, eating pancakes.  No deficits on exam today.  VS stable.        Medications:   Scheduled Medications:  atorvastatin, 40 mg, Oral, Daily With Dinner  insulin glargine, 80 Units, Subcutaneous, Q12H ROSS  insulin lispro, 35 Units, Subcutaneous, TID With  Meals  insulin lispro, 5-25 Units, Subcutaneous, TID AC      Continuous IV Infusions:    Insulin drip d/c 4/25     PRN Meds:  acetaminophen, 650 mg, Oral, Q6H PRN - x 1 4/25  aluminum-magnesium hydroxide-simethicone, 30 mL, Oral, Q6H PRN  ondansetron, 4 mg, Intravenous, Q6H PRN      Discharge Plan: tbd    Vital Signs (last 3 days)       Date/Time Temp Pulse Resp BP MAP (mmHg) SpO2 O2 Device Patient Position - Orthostatic VS Pain    04/27/25 0746 -- -- -- -- -- 95 % None (Room air) -- No Pain    04/27/25 07:35:42 98.3 °F (36.8 °C) 96 -- 116/81 93 95 % -- -- --    04/27/25 07:35:04 98.3 °F (36.8 °C) 101 -- 116/81 93 95 % -- -- --    04/27/25 0522 -- -- -- -- -- -- -- -- 4    04/26/25 22:08:04 -- 86 -- -- -- 95 % -- -- --    04/26/25 22:03:21 98.5 °F (36.9 °C) 87 17 121/78 92 95 % None (Room air) -- --    04/26/25 2200 98.5 °F (36.9 °C) -- -- 121/78 92 -- -- -- --    04/26/25 2100 -- -- -- -- -- -- -- -- No Pain    04/26/25 07:23:15 98.6 °F (37 °C) 96 -- 125/83 97 93 % -- -- No Pain    04/26/25 07:23:08 98.6 °F (37 °C) -- -- 125/83 97 95 % -- -- --    04/25/25 21:12:32 98.4 °F (36.9 °C) 101 -- 126/83 97 92 % -- -- No Pain    04/25/25 2033 -- -- -- -- -- 95 % None (Room air) -- --    04/25/25 15:10:15 -- 101 -- 116/78 91 94 % -- -- --    04/25/25 1510 -- -- -- -- -- -- -- -- 8    04/25/25 0900 -- -- -- -- -- -- -- -- No Pain    04/25/25 08:13:08 97.8 °F (36.6 °C) 89 -- 113/74 87 93 % -- -- --    04/24/25 22:28:24 98.8 °F (37.1 °C) 97 18 124/88 100 93 % None (Room air) Sitting --    04/24/25 2215 -- -- -- -- -- -- -- -- No Pain    04/24/25 2159 -- -- -- -- -- -- None (Room air) -- --    04/24/25 1902 -- -- -- -- -- -- -- -- No Pain    04/24/25 1900 97.9 °F (36.6 °C) 102 18 169/92 122 98 % None (Room air) Sitting --          Weight (last 2 days)       None            Pertinent Labs/Diagnostic Results:   Radiology:  XR chest 2 views   ED Interpretation by Ale Mccullough PA-C (04/24 2118)   No acute cardiopulmonary  disease.        Final Interpretation by Cassidy Katz MD (04/25 0545)      No acute cardiopulmonary disease.            Workstation performed: [id number]           Cardiology:  ECG 12 lead   Final Result by Jose Crowder MD (04/25 2052)   Normal sinus rhythm   Nonspecific ST and T wave abnormality   Abnormal ECG   When compared with ECG of 10-Rafi-2022 15:00,   No significant change was found   Confirmed by Jose Crowder ([id number]) on 4/25/2025 8:52:02 PM        GI:  No orders to display           Results from last 7 days   Lab Units 04/26/25  0512 04/24/25  1936   WBC Thousand/uL 9.73 12.61*   HEMOGLOBIN g/dL 15.6 16.0   HEMATOCRIT % 46.1 44.6   PLATELETS Thousands/uL 262 286   TOTAL NEUT ABS Thousands/µL 5.99  --          Results from last 7 days   Lab Units 04/26/25  0512 04/25/25  0500 04/24/25  1936   SODIUM mmol/L 132* 136 128*   POTASSIUM mmol/L 4.0 3.2* 4.1   CHLORIDE mmol/L 100 100 91*   CO2 mmol/L 26 28 27   ANION GAP mmol/L 6 8 10   BUN mg/dL 10 8 11   CREATININE mg/dL 0.77 0.77 1.14   EGFR ml/min/1.73sq m 105 105 74   CALCIUM mg/dL 8.8 9.5 9.9     Results from last 7 days   Lab Units 04/26/25  0512 04/24/25  1936   AST U/L 34 28   ALT U/L 38 34   ALK PHOS U/L 106* 144*   TOTAL PROTEIN g/dL 6.7 7.8   ALBUMIN g/dL 3.7 4.4   TOTAL BILIRUBIN mg/dL 1.03* 1.19*     Results from last 7 days   Lab Units 04/27/25  1145 04/27/25  0741 04/26/25  2031 04/26/25  1543 04/26/25  1345 04/26/25  1136 04/26/25  0725 04/25/25  2049 04/25/25  1656 04/25/25  1513 04/25/25  1341 04/25/25  1151   POC GLUCOSE mg/dl 246* 220* 162* 250* 425* 370* 372* 332* 354* 329* 363* 339*     Results from last 7 days   Lab Units 04/26/25  0512 04/25/25  0500 04/24/25 1936   GLUCOSE RANDOM mg/dL 376* 186* 743*         Results from last 7 days   Lab Units 04/24/25 1936   HEMOGLOBIN A1C % 12.0*   EAG mg/dl 298     Beta- Hydroxybutyrate   Date Value Ref Range Status   04/24/2025 0.06 0.02 - 0.27 mmol/L Final          Results  from last 7 days   Lab Units 04/24/25  1936   PH FRANCISCO  7.385   PCO2 FRANCISCO mm Hg 48.9   PO2 FRANCISCO mm Hg 54.3*   HCO3 FRANCISCO mmol/L 28.6   BASE EXC FRANCISCO mmol/L 2.5   O2 CONTENT FRANCISCO ml/dL 21.4   O2 HGB, VENOUS % 87.3*         Results from last 7 days   Lab Units 04/24/25  1936   CLARITY UA  Clear   COLOR UA  Colorless   SPEC GRAV UA  1.035*   PH UA  5.5   GLUCOSE UA mg/dl >=1000 (1%)*   KETONES UA mg/dl Negative   BLOOD UA  Negative   PROTEIN UA mg/dl Negative   NITRITE UA  Negative   BILIRUBIN UA  Negative   UROBILINOGEN UA (BE) mg/dl <2.0   LEUKOCYTES UA  Elevated glucose may cause decreased leukocyte values. See urine microscopic for UWBC result*   WBC UA /hpf 1-2   RBC UA /hpf 1-2   BACTERIA UA /hpf None Seen   EPITHELIAL CELLS WET PREP /hpf Occasional         Network Utilization Review Department  ATTENTION: Please call with any questions or concerns to [phone] and carefully listen to the prompts so that you are directed to the right person. All voicemails are confidential.   For Discharge needs, contact Care Management DC Support Team at [phone] opt. 2  Send all requests for admission clinical reviews, approved or denied determinations and any other requests to dedicated fax number below belonging to the campus where the patient is receiving treatment. List of dedicated fax numbers for the Facilities:  FACILITY NAME UR FAX NUMBER   ADMISSION DENIALS (Administrative/Medical Necessity) [phone]   DISCHARGE SUPPORT TEAM (NETWORK) [phone]   PARENT CHILD HEALTH (Maternity/NICU/Pediatrics) [phone]   [facility] [phone]   [facility] [phone]   [facility] [phone]   [facility] [phone]   [facility] [phone]   [facility] [phone]   [facility] [phone]   [facility]  [place] [phone]   [facility] [phone]   [facility] [phone]   [facility] [phone]   [facility] [phone]

## 2025-04-26 NOTE — ASSESSMENT & PLAN NOTE
Intermittent for weeks with associated polydipsia and polyuria, denies currently during exam  Most likely in setting of uncontrolled diabetes/hyperglycemia    Agree the previous symptoms likely related to volume depletion.  Seems to be doing slightly better today.  Continue to Monitor closely.

## 2025-04-26 NOTE — PROGRESS NOTES
Progress Note - Hospitalist   Name: Param Somers 50 y.o. male I MRN: [id number]  Unit/Bed#: -01 I Date of Admission: 4/24/2025   Date of Service: 4/26/2025 I Hospital Day: 0    Assessment & Plan  Type 2 diabetes mellitus with hyperglycemia, without long-term current use of insulin (HCC)  Lab Results   Component Value Date    HGBA1C 12.0 (H) 04/24/2025       Recent Labs     04/25/25  1656 04/25/25  2049 04/26/25  0725 04/26/25  1136   POCGLU 354* 332* 372* 370*       Blood Sugar Average: Last 72 hrs:  (P) [id number]  Diagnosed December 2024, placed metformin but reports only took for 1 day due to side effect of abdominal cramping, no follow-up since then  Presenting with blood glucose of 743, gap WNL, bicarb WNL, no indication of DKA at this time  Corrected sodium 138  Initiated on IV insulin drip with protocol in the ED eventually discontinued.  A1c 12    Blood sugars are still uncontrolled with current regimen.    Increased Lantus to 60U BID and Humolog with meals 30 TID WM.  Increase algorithm for sliding scale.  If no significant improvement with current regimen may need addition of U-500 instead.  Continue to monitor blood glucose  Given persistent elevated blood glucose noted stable for discharge at this point.  Monitor  Dizziness  Intermittent for weeks with associated polydipsia and polyuria, denies currently during exam  Most likely in setting of uncontrolled diabetes/hyperglycemia    Agree the previous symptoms likely related to volume depletion.  Seems to be doing slightly better today.  Continue to Monitor closely.  Obesity  Discussed importance of weight loss and increase physical activity to better help with blood glucose management.  Dietary modifications recommended    VTE Pharmacologic Prophylaxis: VTE Score: 2 Low Risk (Score 0-2) - Encourage Ambulation.    Mobility:   Basic Mobility Inpatient Raw Score: 24  JH-HLM Goal: 8: Walk 250 feet or more  JH-HLM Achieved: 7: Walk 25 feet or  more  JH-HLM Goal NOT achieved. Continue with multidisciplinary rounding and encourage appropriate mobility to improve upon JH-HLM goals.    Patient Centered Rounds: I performed bedside rounds with nursing staff today.   Discussions with Specialists or Other Care Team Provider: Discussed with care management teal    Education and Discussions with Family / Patient:  Patient.     Current Length of Stay: 0 day(s)  Current Patient Status: Observation   Certification Statement: The patient will continue to require additional inpatient hospital stay due to ongoing uncontrolled diabetes requiring fine adjustments of medical therapy  Discharge Plan: Anticipate discharge in 24-48 hrs to home.    Code Status: Level 1 - Full Code    Subjective     Patient evaluated this morning.  Still fatigued and blood glucose is 2300.  Denies any nausea or vomiting.      Objective :  Temp:  [98.4 °F (36.9 °C)-98.6 °F (37 °C)] 98.6 °F (37 °C)  HR:  [] 96  BP: (116-126)/(78-83) 125/83  SpO2:  [92 %-95 %] 93 %  O2 Device: None (Room air)    Body mass index is 39.99 kg/m².     Input and Output Summary (last 24 hours):     Intake/Output Summary (Last 24 hours) at 4/26/2025 1227  Last data filed at 4/25/2025 1330  Gross per 24 hour   Intake 480 ml   Output --   Net 480 ml       Physical Exam  Vitals and nursing note reviewed.   Constitutional:       Appearance: Normal appearance. He is obese.   HENT:      Head: Normocephalic and atraumatic.      Right Ear: External ear normal.      Left Ear: External ear normal.      Nose: Nose normal. No congestion or rhinorrhea.      Mouth/Throat:      Mouth: Mucous membranes are moist.      Pharynx: Oropharynx is clear. No oropharyngeal exudate or posterior oropharyngeal erythema.   Eyes:      General: No scleral icterus.        Right eye: No discharge.         Left eye: No discharge.      Pupils: Pupils are equal, round, and reactive to light.   Neck:      Vascular: No carotid bruit.   Cardiovascular:       Rate and Rhythm: Normal rate and regular rhythm.      Pulses: Normal pulses.      Heart sounds: No murmur heard.     No friction rub. No gallop.   Pulmonary:      Effort: Pulmonary effort is normal. No respiratory distress.      Breath sounds: Normal breath sounds. No stridor. No wheezing, rhonchi or rales.   Abdominal:      General: Abdomen is flat. Bowel sounds are normal. There is no distension.      Palpations: Abdomen is soft. There is no mass.      Tenderness: There is no abdominal tenderness. There is no guarding or rebound.      Hernia: No hernia is present.   Musculoskeletal:         General: No swelling, tenderness, deformity or signs of injury. Normal range of motion.      Cervical back: Normal range of motion. No rigidity. No muscular tenderness.   Lymphadenopathy:      Cervical: No cervical adenopathy.   Skin:     General: Skin is warm and dry.      Capillary Refill: Capillary refill takes less than 2 seconds.      Coloration: Skin is not jaundiced or pale.      Findings: No bruising or erythema.   Neurological:      General: No focal deficit present.      Mental Status: He is alert and oriented to person, place, and time. Mental status is at baseline.      Cranial Nerves: No cranial nerve deficit.      Sensory: No sensory deficit.      Motor: No weakness.      Coordination: Coordination normal.      Deep Tendon Reflexes: Reflexes normal.   Psychiatric:         Mood and Affect: Mood normal.         Behavior: Behavior normal.         Thought Content: Thought content normal.         Judgment: Judgment normal.           Lines/Drains:              Lab Results: I have reviewed the following results:   Results from last 7 days   Lab Units 04/26/25  0512   WBC Thousand/uL 9.73   HEMOGLOBIN g/dL 15.6   HEMATOCRIT % 46.1   PLATELETS Thousands/uL 262   SEGS PCT % 62   LYMPHO PCT % 30   MONO PCT % 6   EOS PCT % 2     Results from last 7 days   Lab Units 04/26/25  0512   SODIUM mmol/L 132*   POTASSIUM mmol/L  4.0   CHLORIDE mmol/L 100   CO2 mmol/L 26   BUN mg/dL 10   CREATININE mg/dL 0.77   ANION GAP mmol/L 6   CALCIUM mg/dL 8.8   ALBUMIN g/dL 3.7   TOTAL BILIRUBIN mg/dL 1.03*   ALK PHOS U/L 106*   ALT U/L 38   AST U/L 34   GLUCOSE RANDOM mg/dL 376*         Results from last 7 days   Lab Units 04/26/25  1136 04/26/25  0725 04/25/25  2049 04/25/25  1656 04/25/25  1513 04/25/25  1341 04/25/25  1151 04/25/25  1116 04/25/25  0909 04/25/25  0656 04/25/25  0502 04/25/25  0304   POC GLUCOSE mg/dl 370* 372* 332* 354* 329* 363* 339* 382* 334* 169* 204* 275*     Results from last 7 days   Lab Units 04/24/25  1936   HEMOGLOBIN A1C % 12.0*           Recent Cultures (last 7 days):               Last 24 Hours Medication List:     Current Facility-Administered Medications:     acetaminophen (TYLENOL) tablet 650 mg, Q6H PRN    aluminum-magnesium hydroxide-simethicone (MAALOX) oral suspension 30 mL, Q6H PRN    atorvastatin (LIPITOR) tablet 40 mg, Daily With Dinner    insulin glargine (LANTUS) subcutaneous injection 60 Units 0.6 mL, Q12H ROSS    insulin lispro (HumALOG/ADMELOG) 100 units/mL subcutaneous injection 30 Units, TID With Meals    insulin lispro (HumALOG/ADMELOG) 100 units/mL subcutaneous injection 4-20 Units, TID AC **AND** Fingerstick Glucose (POCT), TID AC    ondansetron (ZOFRAN) injection 4 mg, Q6H PRN    Administrative Statements   Today, Patient Was Seen By: Franco Giordano MD      **Please Note: This note may have been constructed using a voice recognition system.**

## 2025-04-27 LAB
C PEPTIDE SERPL-MCNC: 3.7 NG/ML (ref 1.1–4.4)
GLUCOSE SERPL-MCNC: 133 MG/DL (ref 65–140)
GLUCOSE SERPL-MCNC: 220 MG/DL (ref 65–140)
GLUCOSE SERPL-MCNC: 222 MG/DL (ref 65–140)
GLUCOSE SERPL-MCNC: 246 MG/DL (ref 65–140)

## 2025-04-27 PROCEDURE — 82948 REAGENT STRIP/BLOOD GLUCOSE: CPT

## 2025-04-27 PROCEDURE — 99233 SBSQ HOSP IP/OBS HIGH 50: CPT | Performed by: INTERNAL MEDICINE

## 2025-04-27 RX ORDER — INSULIN GLARGINE 100 [IU]/ML
80 INJECTION, SOLUTION SUBCUTANEOUS EVERY 12 HOURS SCHEDULED
Status: DISCONTINUED | OUTPATIENT
Start: 2025-04-27 | End: 2025-04-28 | Stop reason: HOSPADM

## 2025-04-27 RX ORDER — INSULIN LISPRO 100 [IU]/ML
40 INJECTION, SOLUTION INTRAVENOUS; SUBCUTANEOUS
Status: DISCONTINUED | OUTPATIENT
Start: 2025-04-27 | End: 2025-04-27

## 2025-04-27 RX ORDER — INSULIN LISPRO 100 [IU]/ML
5-25 INJECTION, SOLUTION INTRAVENOUS; SUBCUTANEOUS
Status: DISCONTINUED | OUTPATIENT
Start: 2025-04-27 | End: 2025-04-28 | Stop reason: HOSPADM

## 2025-04-27 RX ORDER — INSULIN LISPRO 100 [IU]/ML
35 INJECTION, SOLUTION INTRAVENOUS; SUBCUTANEOUS
Status: DISCONTINUED | OUTPATIENT
Start: 2025-04-27 | End: 2025-04-28 | Stop reason: HOSPADM

## 2025-04-27 RX ADMIN — ACETAMINOPHEN 650 MG: 325 TABLET, FILM COATED ORAL at 05:22

## 2025-04-27 RX ADMIN — INSULIN LISPRO 10 UNITS: 100 INJECTION, SOLUTION INTRAVENOUS; SUBCUTANEOUS at 08:03

## 2025-04-27 RX ADMIN — INSULIN GLARGINE 80 UNITS: 100 INJECTION, SOLUTION SUBCUTANEOUS at 08:02

## 2025-04-27 RX ADMIN — INSULIN LISPRO 10 UNITS: 100 INJECTION, SOLUTION INTRAVENOUS; SUBCUTANEOUS at 11:47

## 2025-04-27 RX ADMIN — ATORVASTATIN CALCIUM 40 MG: 40 TABLET, FILM COATED ORAL at 16:47

## 2025-04-27 RX ADMIN — INSULIN GLARGINE 80 UNITS: 100 INJECTION, SOLUTION SUBCUTANEOUS at 22:14

## 2025-04-27 RX ADMIN — INSULIN LISPRO 35 UNITS: 100 INJECTION, SOLUTION INTRAVENOUS; SUBCUTANEOUS at 11:47

## 2025-04-27 RX ADMIN — INSULIN LISPRO 10 UNITS: 100 INJECTION, SOLUTION INTRAVENOUS; SUBCUTANEOUS at 16:44

## 2025-04-27 RX ADMIN — INSULIN LISPRO 35 UNITS: 100 INJECTION, SOLUTION INTRAVENOUS; SUBCUTANEOUS at 16:45

## 2025-04-27 NOTE — ASSESSMENT & PLAN NOTE
Lab Results   Component Value Date    HGBA1C 12.0 (H) 04/24/2025       Recent Labs     04/26/25  1345 04/26/25  1543 04/26/25 2031 04/27/25  0741   POCGLU 425* 250* 162* 220*       Blood Sugar Average: Last 72 hrs:  (P) 315.5  Diagnosed December 2024, placed metformin but reports only took for 1 day due to side effect of abdominal cramping, no follow-up since then  Presenting with blood glucose of 743, gap WNL, bicarb WNL, no indication of DKA at this time  Corrected sodium 138  Initiated on IV insulin drip with protocol in the ED eventually discontinued.  A1c 12    Blood sugars improving but are still uncontrolled with current regimen.    Increased Lantus from 60U -> 80 BID and Humolog with meals 30 -> 35 TID WM.  Continue sliding scale  If no significant improvement with current regimen may need addition of U-500 instead given amount of insulin Units  Continue to monitor blood glucose  Given persistent elevated blood glucose noted stable for discharge at this point.  Encouraged better dietary choices both me and nurse discussed with him importance of avoiding starchy foods - I have him on the lowest carbohydrate diet available in the hospital which in my opinion is still not low enough he should prioritize protein intake.  Continue to Monitor

## 2025-04-27 NOTE — PLAN OF CARE
Problem: METABOLIC, FLUID AND ELECTROLYTES - ADULT  Goal: Glucose maintained within target range  Description: INTERVENTIONS:- Monitor Blood Glucose as ordered- Assess for signs and symptoms of hyperglycemia and hypoglycemia- Administer ordered medications to maintain glucose within target range- Assess nutritional intake and initiate nutrition service referral as needed  Outcome: Progressing     Problem: Nutrition/Hydration-ADULT  Goal: Nutrient/Hydration intake appropriate for improving, restoring or maintaining nutritional needs  Description: Monitor and assess patient's nutrition/hydration status for malnutrition. Collaborate with interdisciplinary team and initiate plan and interventions as ordered.  Monitor patient's weight and dietary intake as ordered or per policy. Utilize nutrition screening tool and intervene as necessary. Determine patient's food preferences and provide high-protein, high-caloric foods as appropriate. INTERVENTIONS:- Monitor oral intake, urinary output, labs, and treatment plans- Assess nutrition and hydration status and recommend course of action- Evaluate amount of meals eaten- Assist patient with eating if necessary - Allow adequate time for meals- Recommend/ encourage appropriate diets, oral nutritional supplements, and vitamin/mineral supplements- Order, calculate, and assess calorie counts as needed- Recommend, monitor, and adjust tube feedings and TPN/PPN based on assessed needs- Assess need for intravenous fluids- Provide specific nutrition/hydration education as appropriate- Include patient/family/caregiver in decisions related to nutrition  Outcome: Progressing

## 2025-04-27 NOTE — PROGRESS NOTES
Progress Note - Hospitalist   Name: Param Somers 50 y.o. male I MRN: [id number]  Unit/Bed#: -01 I Date of Admission: 4/24/2025   Date of Service: 4/27/2025 I Hospital Day: 1    Assessment & Plan  Type 2 diabetes mellitus with hyperglycemia, without long-term current use of insulin ([facility])  Lab Results   Component Value Date    HGBA1C 12.0 (H) 04/24/2025       Recent Labs     04/26/25  1345 04/26/25  1543 04/26/25  2031 04/27/25  0741   POCGLU 425* 250* 162* 220*       Blood Sugar Average: Last 72 hrs:  (P) 315.5  Diagnosed December 2024, placed metformin but reports only took for 1 day due to side effect of abdominal cramping, no follow-up since then  Presenting with blood glucose of 743, gap WNL, bicarb WNL, no indication of DKA at this time  Corrected sodium 138  Initiated on IV insulin drip with protocol in the ED eventually discontinued.  A1c 12    Blood sugars improving but are still uncontrolled with current regimen.    Increased Lantus from 60U -> 80 BID and Humolog with meals 30 -> 35 TID WM.  Continue sliding scale  If no significant improvement with current regimen may need addition of U-500 instead given amount of insulin Units  Continue to monitor blood glucose  Given persistent elevated blood glucose noted stable for discharge at this point.  Encouraged better dietary choices both me and nurse discussed with him importance of avoiding starchy foods - I have him on the lowest carbohydrate diet available in the hospital which in my opinion is still not low enough he should prioritize protein intake.  Continue to Monitor  Dizziness  Intermittent for weeks with associated polydipsia and polyuria, denies currently during exam  Most likely in setting of uncontrolled diabetes/hyperglycemia    Agree the previous symptoms likely related to volume depletion.  Seems to be doing slightly better today.  Continue to Monitor closely.  Obesity  Discussed importance of weight loss and increase physical activity to  better help with blood glucose management.  Dietary modifications recommended    VTE Pharmacologic Prophylaxis: VTE Score: 2   Encourage ambulation    Mobility:   Basic Mobility Inpatient Raw Score: 24  JH-HLM Goal: 8: Walk 250 feet or more  JH-HLM Achieved: 8: Walk 250 feet ot more      Patient Centered Rounds: I performed bedside rounds with nursing staff today.   Discussions with Specialists or Other Care Team Provider: Discussed with care management team    Education and Discussions with Family / Patient:  Patient.     Current Length of Stay: 1 day(s)  Current Patient Status: Inpatient   Certification Statement: The patient will continue to require additional inpatient hospital stay due to need for better glucose control  Discharge Plan: Anticipate discharge tomorrow to home.    Code Status: Level 1 - Full Code    Subjective     Patient valuated this man.  Denies any chest pain nausea vomiting.  Does mention some fatigue however.  Since yesterday still had glucose in the 400s.  This today was eating pancakes.  Oriented not with pancakes    Objective :  Temp:  [98.3 °F (36.8 °C)-98.5 °F (36.9 °C)] 98.3 °F (36.8 °C)  HR:  [] 96  BP: (116-121)/(78-81) 116/81  Resp:  [17] 17  SpO2:  [95 %] 95 %  O2 Device: None (Room air)    Body mass index is 39.99 kg/m².     Input and Output Summary (last 24 hours):     Intake/Output Summary (Last 24 hours) at 4/27/2025 1115  Last data filed at 4/27/2025 0747  Gross per 24 hour   Intake 500 ml   Output --   Net 500 ml       Physical Exam  Vitals and nursing note reviewed.   Constitutional:       Appearance: Normal appearance. He is obese.   HENT:      Head: Normocephalic and atraumatic.      Right Ear: External ear normal.      Left Ear: External ear normal.      Nose: Nose normal. No congestion or rhinorrhea.      Mouth/Throat:      Mouth: Mucous membranes are moist.      Pharynx: Oropharynx is clear. No oropharyngeal exudate or posterior oropharyngeal erythema.   Eyes:       General: No scleral icterus.        Right eye: No discharge.         Left eye: No discharge.      Pupils: Pupils are equal, round, and reactive to light.   Neck:      Vascular: No carotid bruit.   Cardiovascular:      Rate and Rhythm: Normal rate and regular rhythm.      Pulses: Normal pulses.      Heart sounds: No murmur heard.     No friction rub. No gallop.   Pulmonary:      Effort: Pulmonary effort is normal. No respiratory distress.      Breath sounds: Normal breath sounds. No stridor. No wheezing, rhonchi or rales.   Abdominal:      General: Abdomen is flat. Bowel sounds are normal. There is no distension.      Palpations: Abdomen is soft. There is no mass.      Tenderness: There is no abdominal tenderness. There is no guarding or rebound.      Hernia: No hernia is present.   Musculoskeletal:         General: No swelling, tenderness, deformity or signs of injury. Normal range of motion.      Cervical back: Normal range of motion. No rigidity. No muscular tenderness.   Lymphadenopathy:      Cervical: No cervical adenopathy.   Skin:     General: Skin is warm and dry.      Capillary Refill: Capillary refill takes less than 2 seconds.      Coloration: Skin is not jaundiced or pale.      Findings: No bruising or erythema.   Neurological:      General: No focal deficit present.      Mental Status: He is alert and oriented to person, place, and time. Mental status is at baseline.      Cranial Nerves: No cranial nerve deficit.      Sensory: No sensory deficit.      Motor: No weakness.      Coordination: Coordination normal.      Deep Tendon Reflexes: Reflexes normal.   Psychiatric:         Mood and Affect: Mood normal.         Behavior: Behavior normal.         Thought Content: Thought content normal.         Judgment: Judgment normal.           Lines/Drains:              Lab Results: I have reviewed the following results:   Results from last 7 days   Lab Units 04/26/25  0512   WBC Thousand/uL 9.73   HEMOGLOBIN g/dL  15.6   HEMATOCRIT % 46.1   PLATELETS Thousands/uL 262   SEGS PCT % 62   LYMPHO PCT % 30   MONO PCT % 6   EOS PCT % 2     Results from last 7 days   Lab Units 04/26/25  0512   SODIUM mmol/L 132*   POTASSIUM mmol/L 4.0   CHLORIDE mmol/L 100   CO2 mmol/L 26   BUN mg/dL 10   CREATININE mg/dL 0.77   ANION GAP mmol/L 6   CALCIUM mg/dL 8.8   ALBUMIN g/dL 3.7   TOTAL BILIRUBIN mg/dL 1.03*   ALK PHOS U/L 106*   ALT U/L 38   AST U/L 34   GLUCOSE RANDOM mg/dL 376*         Results from last 7 days   Lab Units 04/27/25  0741 04/26/25  2031 04/26/25  1543 04/26/25  1345 04/26/25  1136 04/26/25  0725 04/25/25  2049 04/25/25  1656 04/25/25  1513 04/25/25  1341 04/25/25  1151 04/25/25  1116   POC GLUCOSE mg/dl 220* 162* 250* 425* 370* 372* 332* 354* 329* 363* 339* 382*     Results from last 7 days   Lab Units 04/24/25  1936   HEMOGLOBIN A1C % 12.0*           Recent Cultures (last 7 days):               Last 24 Hours Medication List:     Current Facility-Administered Medications:     acetaminophen (TYLENOL) tablet 650 mg, Q6H PRN    aluminum-magnesium hydroxide-simethicone (MAALOX) oral suspension 30 mL, Q6H PRN    atorvastatin (LIPITOR) tablet 40 mg, Daily With Dinner    insulin glargine (LANTUS) subcutaneous injection 80 Units 0.8 mL, Q12H ROSS    insulin lispro (HumALOG/ADMELOG) 100 units/mL subcutaneous injection 35 Units, TID With Meals    insulin lispro (HumALOG/ADMELOG) 100 units/mL subcutaneous injection 5-25 Units, TID AC **AND** Fingerstick Glucose (POCT), TID AC    ondansetron (ZOFRAN) injection 4 mg, Q6H PRN    Administrative Statements   Today, Patient Was Seen By: Franco Giordano MD      **Please Note: This note may have been constructed using a voice recognition system.**

## 2025-04-27 NOTE — PLAN OF CARE
Problem: Nutrition/Hydration-ADULT  Goal: Nutrient/Hydration intake appropriate for improving, restoring or maintaining nutritional needs  Description: Monitor and assess patient's nutrition/hydration status for malnutrition. Collaborate with interdisciplinary team and initiate plan and interventions as ordered.  Monitor patient's weight and dietary intake as ordered or per policy. Utilize nutrition screening tool and intervene as necessary. Determine patient's food preferences and provide high-protein, high-caloric foods as appropriate. INTERVENTIONS:- Monitor oral intake, urinary output, labs, and treatment plans- Assess nutrition and hydration status and recommend course of action- Evaluate amount of meals eaten- Assist patient with eating if necessary - Allow adequate time for meals- Recommend/ encourage appropriate diets, oral nutritional supplements, and vitamin/mineral supplements- Order, calculate, and assess calorie counts as needed- Recommend, monitor, and adjust tube feedings and TPN/PPN based on assessed needs- Assess need for intravenous fluids- Provide specific nutrition/hydration education as appropriate- Include patient/family/caregiver in decisions related to nutrition  Outcome: Progressing     Problem: SAFETY ADULT  Goal: Maintain or return to baseline ADL function  Description: INTERVENTIONS:-  Assess patient's ability to carry out ADLs; assess patient's baseline for ADL function and identify physical deficits which impact ability to perform ADLs (bathing, care of mouth/teeth, toileting, grooming, dressing, etc.)- Assess/evaluate cause of self-care deficits - Assess range of motion- Assess patient's mobility; develop plan if impaired- Assess patient's need for assistive devices and provide as appropriate- Encourage maximum independence but intervene and supervise when necessary- Involve family in performance of ADLs- Assess for home care needs following discharge - Consider OT consult to assist  with ADL evaluation and planning for discharge- Provide patient education as appropriate  Outcome: Progressing     Problem: DISCHARGE PLANNING  Goal: Discharge to home or other facility with appropriate resources  Description: INTERVENTIONS:- Identify barriers to discharge w/patient and caregiver- Arrange for needed discharge resources and transportation as appropriate- Identify discharge learning needs (meds, wound care, etc.)- Arrange for interpretive services to assist at discharge as needed- Refer to Case Management Department for coordinating discharge planning if the patient needs post-hospital services based on physician/advanced practitioner order or complex needs related to functional status, cognitive ability, or social support system  Outcome: Progressing     Problem: Knowledge Deficit  Goal: Patient/family/caregiver demonstrates understanding of disease process, treatment plan, medications, and discharge instructions  Description: Complete learning assessment and assess knowledge base.Interventions:- Provide teaching at level of understanding- Provide teaching via preferred learning methods  Outcome: Progressing     Problem: METABOLIC, FLUID AND ELECTROLYTES - ADULT  Goal: Glucose maintained within target range  Description: INTERVENTIONS:- Monitor Blood Glucose as ordered- Assess for signs and symptoms of hyperglycemia and hypoglycemia- Administer ordered medications to maintain glucose within target range- Assess nutritional intake and initiate nutrition service referral as needed  Outcome: Progressing

## 2025-04-27 NOTE — UTILIZATION REVIEW
NOTIFICATION OF INPATIENT ADMISSION   AUTHORIZATION REQUEST   SERVICING FACILITY:   [place] [address]  Tax ID: [id number]  NPI: [id number] ATTENDING PROVIDER:  Attending Name and NPI#: Franco Giordano Md [[id number]]  Address: [address] [place] [address]  Phone: [phone]     ADMISSION INFORMATION:  Place of Service: Inpatient [facility]  Place of Service Code: 21  Inpatient Admission Date/Time: 4/26/25  2:09 PM  Discharge Date/Time: No discharge date for patient encounter.  Admitting Diagnosis Code/Description:  Polydipsia [R63.1]  Polyuria [R35.89]  Dizziness [R42]  Hyperglycemia [R73.9]     UTILIZATION REVIEW CONTACT:  Sabiha Edouard Utilization   Network Utilization Review Department  Phone: [phone]  Fax [phone]  Email: [email or web address]  Contact for approvals/pending authorizations, clinical reviews, and discharge.     PHYSICIAN ADVISORY SERVICES:  Medical Necessity Denial & Oprt-gj-Euze Review  Phone: [phone]  Fax: [phone]  Email: [email or web address]     DISCHARGE SUPPORT TEAM:  For Patients Discharge Needs & Updates  Phone: [phone] opt. 2 Fax: [phone]  Email: [email or web address]

## 2025-04-28 VITALS
HEART RATE: 93 BPM | DIASTOLIC BLOOD PRESSURE: 77 MMHG | OXYGEN SATURATION: 95 % | WEIGHT: 240.3 LBS | RESPIRATION RATE: 18 BRPM | HEIGHT: 65 IN | SYSTOLIC BLOOD PRESSURE: 120 MMHG | BODY MASS INDEX: 40.04 KG/M2 | TEMPERATURE: 97.7 F

## 2025-04-28 LAB
GLUCOSE SERPL-MCNC: 193 MG/DL (ref 65–140)
GLUCOSE SERPL-MCNC: 245 MG/DL (ref 65–140)

## 2025-04-28 PROCEDURE — 99239 HOSP IP/OBS DSCHRG MGMT >30: CPT | Performed by: INTERNAL MEDICINE

## 2025-04-28 PROCEDURE — 82948 REAGENT STRIP/BLOOD GLUCOSE: CPT

## 2025-04-28 RX ORDER — INSULIN DEGLUDEC 100 U/ML
80 INJECTION, SOLUTION SUBCUTANEOUS EVERY 12 HOURS SCHEDULED
Qty: 15 ML | Refills: 0 | Status: SHIPPED | OUTPATIENT
Start: 2025-04-28 | End: 2025-04-28

## 2025-04-28 RX ORDER — PEN NEEDLE, DIABETIC 32GX 5/32"
NEEDLE, DISPOSABLE MISCELLANEOUS
Qty: 100 EACH | Refills: 0 | Status: SHIPPED | OUTPATIENT
Start: 2025-04-28

## 2025-04-28 RX ORDER — INSULIN GLARGINE 100 [IU]/ML
80 INJECTION, SOLUTION SUBCUTANEOUS 2 TIMES DAILY
Qty: 45 ML | Refills: 0 | Status: SHIPPED | OUTPATIENT
Start: 2025-04-28 | End: 2025-04-28

## 2025-04-28 RX ORDER — BLOOD-GLUCOSE METER
KIT MISCELLANEOUS
Qty: 1 KIT | Refills: 0 | Status: SHIPPED | OUTPATIENT
Start: 2025-04-28

## 2025-04-28 RX ORDER — INSULIN GLARGINE-YFGN 100 [IU]/ML
80 INJECTION, SOLUTION SUBCUTANEOUS 2 TIMES DAILY
Qty: 135 ML | Refills: 0 | Status: SHIPPED | OUTPATIENT
Start: 2025-04-28 | End: 2025-04-28

## 2025-04-28 RX ORDER — GLUCOSAMINE HCL/CHONDROITIN SU 500-400 MG
CAPSULE ORAL
Qty: 100 EACH | Refills: 0 | Status: SHIPPED | OUTPATIENT
Start: 2025-04-28

## 2025-04-28 RX ORDER — BLOOD SUGAR DIAGNOSTIC
STRIP MISCELLANEOUS
Qty: 100 EACH | Refills: 0 | Status: SHIPPED | OUTPATIENT
Start: 2025-04-28

## 2025-04-28 RX ORDER — INSULIN GLARGINE 100 [IU]/ML
80 INJECTION, SOLUTION SUBCUTANEOUS 2 TIMES DAILY
Qty: 45 ML | Refills: 0 | Status: SHIPPED | OUTPATIENT
Start: 2025-04-28

## 2025-04-28 RX ORDER — INSULIN LISPRO 100 [IU]/ML
30 INJECTION, SOLUTION INTRAVENOUS; SUBCUTANEOUS
Qty: 30 ML | Refills: 0 | Status: SHIPPED | OUTPATIENT
Start: 2025-04-28

## 2025-04-28 RX ORDER — LANCETS 33 GAUGE
EACH MISCELLANEOUS
Qty: 100 EACH | Refills: 0 | Status: SHIPPED | OUTPATIENT
Start: 2025-04-28

## 2025-04-28 RX ADMIN — INSULIN GLARGINE 80 UNITS: 100 INJECTION, SOLUTION SUBCUTANEOUS at 08:34

## 2025-04-28 RX ADMIN — INSULIN LISPRO 35 UNITS: 100 INJECTION, SOLUTION INTRAVENOUS; SUBCUTANEOUS at 12:05

## 2025-04-28 RX ADMIN — INSULIN LISPRO 10 UNITS: 100 INJECTION, SOLUTION INTRAVENOUS; SUBCUTANEOUS at 12:05

## 2025-04-28 NOTE — DISCHARGE SUMMARY
Discharge Summary - Hospitalist   Name: Param Somers 50 y.o. male I MRN: [id number]  Unit/Bed#: -01 I Date of Admission: 4/24/2025   Date of Service: 4/28/2025 I Hospital Day: 2         Discharge Diagnosis:    Uncontrolled diabetes mellitus  Dizziness, resolved  Obesity  Hyperosmolar hyponatremia    Discharging Physician / Practitioner: Franco Giordano MD  PCP: No primary care provider on file.  Admission Date:   Admission Orders (From admission, onward)       Ordered        04/26/25 1409  INPATIENT ADMISSION  Once            04/24/25 2131  Place in Observation  Once                          Discharge Date: 04/28/25    Consultations During Hospital Stay:  Endocrinology         Outpatient follow up Requested:  PCP  Endocrinology    Complications:  None    Reason for Admission: Uncontrolled DM    HPI:  Param Somers is a 50 y.o. male with a PMH of diabetes mellitus type 2, hyperlipidemia, obesity who presents with complaints of gradual worsening of dizziness and feeling off balance, polydipsia, polyuria and generalized malaise for about 3 months.  Patient reports he was diagnosed with diabetes in December and was placed metformin, but took for a day and had severe abdominal cramping so he stopped taking it, but never followed up for diabetes management.  He reports mostly he is has severe dry mouth and is constantly thirsty and has been drinking a lot.  He also reports that he has been urinating a lot more than he normally does.  Denies dysuria.  He also reports overall in the past week or more he has felt drained and has felt intermittent dizziness and feeling like he feels off balance if he gets up too quickly from lying down or sitting, especially in the morning when he wakes up.  He currently denies during exam he feels dizzy and overall feels okay now.  He also reports at night he sometimes gets cramping in his legs.  Denies vision change..     Hospital Course:     The patient was hospitalized  "with  His symptoms were believed to be secondary mostly to uncontrolled diabetes.  He was treated with insulin, initially infusion eventually transition to basal bolus.  Currently seems to be tolerating 80 units of Lantus twice a day, I also recommending mealtime 30 units 3 times a day.  Patient was seen by endocrinology.  I do recommend close follow-up in the outpatient setting, sent insulin and supplies as well as glucometer to the pharmacy.  Also patient oriented to lose weight and to stay in a low carbohydrate diet and exercise.  Patient discharged in stable condition.  Oriented to follow-up with PCP and endocrinology.    Condition at Discharge: good     Discharge Day Visit / Exam:     Subjective:    Patient valuated this morning.  Asymptomatic, wants to go home.  Any nausea or vomiting.  Vitals: Blood Pressure: 120/77 (04/28/25 0736)  Pulse: 93 (04/28/25 0736)  Temperature: 97.7 °F (36.5 °C) (04/28/25 0736)  Temp Source: Oral (04/28/25 0736)  Respirations: 18 (04/28/25 0736)  Height: 5' 5\" (165.1 cm) (04/24/25 2228)  Weight - Scale: 109 kg (240 lb 4.8 oz) (04/24/25 2228)  SpO2: 95 % (04/28/25 0736)    Exam:   Physical Exam  Vitals and nursing note reviewed.   Constitutional:       Appearance: Normal appearance. He is obese.   HENT:      Head: Normocephalic and atraumatic.      Right Ear: External ear normal.      Left Ear: External ear normal.      Nose: Nose normal. No congestion or rhinorrhea.      Mouth/Throat:      Mouth: Mucous membranes are moist.      Pharynx: Oropharynx is clear. No oropharyngeal exudate or posterior oropharyngeal erythema.   Eyes:      General: No scleral icterus.        Right eye: No discharge.         Left eye: No discharge.      Pupils: Pupils are equal, round, and reactive to light.   Neck:      Vascular: No carotid bruit.   Cardiovascular:      Rate and Rhythm: Normal rate and regular rhythm.      Pulses: Normal pulses.      Heart sounds: No murmur heard.     No friction rub. No " gallop.   Pulmonary:      Effort: Pulmonary effort is normal. No respiratory distress.      Breath sounds: Normal breath sounds. No stridor. No wheezing, rhonchi or rales.   Abdominal:      General: Abdomen is flat. Bowel sounds are normal. There is no distension.      Palpations: Abdomen is soft. There is no mass.      Tenderness: There is no abdominal tenderness. There is no guarding or rebound.      Hernia: No hernia is present.   Musculoskeletal:         General: No swelling, tenderness, deformity or signs of injury. Normal range of motion.      Cervical back: Normal range of motion. No rigidity. No muscular tenderness.   Lymphadenopathy:      Cervical: No cervical adenopathy.   Skin:     General: Skin is warm and dry.      Capillary Refill: Capillary refill takes less than 2 seconds.      Coloration: Skin is not jaundiced or pale.      Findings: No bruising or erythema.   Neurological:      General: No focal deficit present.      Mental Status: He is alert and oriented to person, place, and time. Mental status is at baseline.      Cranial Nerves: No cranial nerve deficit.      Sensory: No sensory deficit.      Motor: No weakness.      Coordination: Coordination normal.      Deep Tendon Reflexes: Reflexes normal.   Psychiatric:         Mood and Affect: Mood normal.         Behavior: Behavior normal.         Thought Content: Thought content normal.         Judgment: Judgment normal.           Discussion with Family: Patient himself AAOx4 did not request us to speak with anyone in regards to his plan    Discharge instructions/Information to patient and family:   See after visit summary for information provided to patient and family.      Provisions for Follow-Up Care:  See after visit summary for information related to follow-up care and any pertinent home health orders.      Disposition:     Home    For Discharges to [facility] SNF:   Not Applicable to this Patient - Not Applicable to this  Patient    Planned Readmission: No     Discharge Statement:  I spent 86 minutes discharging the patient. This time was spent on the day of discharge. I had direct contact with the patient on the day of discharge. Greater than 50% of the total time was spent examining patient, answering all patient questions, arranging and discussing plan of care with patient as well as directly providing post-discharge instructions.  Additional time then spent on discharge activities.    Discharge Medications:  See after visit summary for reconciled discharge medications provided to patient and family.      ** Please Note: This note has been constructed using a voice recognition system **

## 2025-04-28 NOTE — PROGRESS NOTES
Glucose this morning noted to be 193 mg/dL. Noted to have decreased significantly from prior glucose readings. Notified Franco EDEN and recieved orders to give long acting insulin only. Will hold Humalog. Endorsed to primary RN, Adolph King.

## 2025-04-28 NOTE — PLAN OF CARE
Problem: DISCHARGE PLANNING  Goal: Discharge to home or other facility with appropriate resources  Description: INTERVENTIONS:- Identify barriers to discharge w/patient and caregiver- Arrange for needed discharge resources and transportation as appropriate- Identify discharge learning needs (meds, wound care, etc.)- Arrange for interpretive services to assist at discharge as needed- Refer to Case Management Department for coordinating discharge planning if the patient needs post-hospital services based on physician/advanced practitioner order or complex needs related to functional status, cognitive ability, or social support system  Outcome: Progressing     Problem: SAFETY ADULT  Goal: Maintain or return to baseline ADL function  Description: INTERVENTIONS:-  Assess patient's ability to carry out ADLs; assess patient's baseline for ADL function and identify physical deficits which impact ability to perform ADLs (bathing, care of mouth/teeth, toileting, grooming, dressing, etc.)- Assess/evaluate cause of self-care deficits - Assess range of motion- Assess patient's mobility; develop plan if impaired- Assess patient's need for assistive devices and provide as appropriate- Encourage maximum independence but intervene and supervise when necessary- Involve family in performance of ADLs- Assess for home care needs following discharge - Consider OT consult to assist with ADL evaluation and planning for discharge- Provide patient education as appropriate  Outcome: Progressing     Problem: Nutrition/Hydration-ADULT  Goal: Nutrient/Hydration intake appropriate for improving, restoring or maintaining nutritional needs  Description: Monitor and assess patient's nutrition/hydration status for malnutrition. Collaborate with interdisciplinary team and initiate plan and interventions as ordered.  Monitor patient's weight and dietary intake as ordered or per policy. Utilize nutrition screening tool and intervene as necessary.  Determine patient's food preferences and provide high-protein, high-caloric foods as appropriate. INTERVENTIONS:- Monitor oral intake, urinary output, labs, and treatment plans- Assess nutrition and hydration status and recommend course of action- Evaluate amount of meals eaten- Assist patient with eating if necessary - Allow adequate time for meals- Recommend/ encourage appropriate diets, oral nutritional supplements, and vitamin/mineral supplements- Order, calculate, and assess calorie counts as needed- Recommend, monitor, and adjust tube feedings and TPN/PPN based on assessed needs- Assess need for intravenous fluids- Provide specific nutrition/hydration education as appropriate- Include patient/family/caregiver in decisions related to nutrition  Outcome: Progressing     Problem: METABOLIC, FLUID AND ELECTROLYTES - ADULT  Goal: Glucose maintained within target range  Description: INTERVENTIONS:- Monitor Blood Glucose as ordered- Assess for signs and symptoms of hyperglycemia and hypoglycemia- Administer ordered medications to maintain glucose within target range- Assess nutritional intake and initiate nutrition service referral as needed  Outcome: Progressing

## 2025-04-28 NOTE — CASE MANAGEMENT
Case Management Progress Note    Patient name Param Somers  Location /-01 MRN [id number]  : 1975 Date 2025       LOS (days): 2  Geometric Mean LOS (GMLOS) (days):   Days to GMLOS:        OBJECTIVE:        Current admission status: Inpatient  Preferred Pharmacy:   [id number] - [address]  [address]  [facility] [address]  Phone: [phone] Fax: [phone]    Primary Care Provider: No primary care provider on file.    Primary Insurance: [employer]  Secondary Insurance:     PROGRESS NOTE:    Patient informed all medications total cost 180.00. Patient stated medications can be paid for by patient. CM asked provider to put in OP endocrine referral. CM provided patient with information to call and make an appointment when it is most convenient.     Per pharmacy Insulin Glargine Solostar: 35.00      [id number] [address]  [address] [address]  Phone: [phone]

## 2025-04-28 NOTE — CASE MANAGEMENT
Case Management Discharge Planning Note    Patient name Param Somers  Location /-01 MRN [id number]  : 1975 Date 2025       Current Admission Date: 2025  Current Admission Diagnosis:Type 2 diabetes mellitus with hyperglycemia, without long-term current use of insulin ([facility])   Patient Active Problem List    Diagnosis Date Noted Date Diagnosed    Obesity 2025     Type 2 diabetes mellitus with hyperglycemia, without long-term current use of insulin ([facility]) 2025     Dizziness 2025     Dyslipidemia, goal LDL below 100 2024     Umbilical hernia without obstruction and without gangrene 2024     Type 2 diabetes mellitus with hemoglobin A1c goal of less than 7.0% ([facility]) 2023     Acute hypoxemic respiratory failure due to COVID-19 ([facility]) 01/10/2022     Morbid obesity with BMI of 40.0-44.9, adult ([facility]) 01/10/2022       LOS (days): 2  Geometric Mean LOS (GMLOS) (days):   Days to GMLOS:     OBJECTIVE:  Risk of Unplanned Readmission Score: 7.19         Current admission status: Inpatient   Preferred Pharmacy:   [facility] - [facility] [place] - [address]  [address]  [facility] [place] [address]  Phone: [phone] Fax: [phone]    Primary Care Provider: No primary care provider on file.    Primary Insurance: [employer]  Secondary Insurance:     DISCHARGE DETAILS:     Monitoring device kit: 22.58, test strip: 38.82, lancets: 10.00, alcohol pads: 4.00, tresiba: 257 for a 10 day supply, insulin pen needle: 20, Humalo.00     [facility] [place] - [address]  [address], [facility] [place] [address]  Phone: [phone]

## 2025-04-29 ENCOUNTER — TELEPHONE (OUTPATIENT)
Age: 50
End: 2025-04-29

## 2025-04-29 NOTE — TELEPHONE ENCOUNTER
Patient called and stated he was discharge from the hospital yesterday.  Per chart review patient is not an establish patient yet. He has an appointment set up for 5/6/25 to establish care w/ Yohana Dietz.  Patient states he is very confused w/ the insulin he was dispense at the pharmacy yesterday. States that he has not used the insulin pens in the past. A call to [facility] pharmacy was made to clarify if patient used the pen correctly yesterday. Per pharmacist he explained to the patient the way to do it and per patient he did it correctly. While the pharmacist was on the phone it was asked if patient had received the humalog as well as there was a Rx send but patient stated he had not received it. Per pharmacist the humalog was not dispensed as it was noted to fill the most affordable one. Per pharmacist it would be $50 out of pocket for the humalog. Patient stated he would pay it. Patient states he has an appointment w/ endocronology on 6/25/25. No further questions.

## 2025-04-29 NOTE — UTILIZATION REVIEW
NOTIFICATION OF ADMISSION DISCHARGE   This is a Notification of Discharge from [facility]. Please be advised that this patient has been discharge from our facility. Below you will find the admission and discharge date and time including the patient’s disposition.   UTILIZATION REVIEW CONTACT:  Utilization Review Assistants  Network Utilization Review Department  Phone: [phone] x carefully listen to the prompts. All voicemails are confidential.  Email: [email or web address]     ADMISSION INFORMATION  PRESENTATION DATE: 4/24/2025  7:04 PM  OBERVATION ADMISSION DATE: 04/24/2025 2131  INPATIENT ADMISSION DATE: 4/26/25  2:09 PM   DISCHARGE DATE: 4/28/2025  2:24 PM   DISPOSITION:Home/Self Care    Network Utilization Review Department  ATTENTION: Please call with any questions or concerns to [phone] and carefully listen to the prompts so that you are directed to the right person. All voicemails are confidential.   For Discharge needs, contact Care Management DC Support Team at [phone] opt. [phone]  Send all requests for admission clinical reviews, approved or denied determinations and any other requests to dedicated fax number below belonging to the campus where the patient is receiving treatment. List of dedicated fax numbers for the Facilities:  FACILITY NAME UR FAX NUMBER   ADMISSION DENIALS (Administrative/Medical Necessity) [phone]   DISCHARGE SUPPORT TEAM ([facility]) [phone]   PARENT CHILD HEALTH (Maternity/NICU/Pediatrics) [phone]   [facility] [phone]   [facility] [phone]   [facility] [phone]   [facility] [phone]   [facility] [phone]   [facility] [phone]   [facility] [phone]   [facility] [phone]    [facility] [phone]   [facility] [phone]   [facility] [phone]   [facility] [phone]

## 2025-05-06 ENCOUNTER — OFFICE VISIT (OUTPATIENT)
Dept: FAMILY MEDICINE CLINIC | Facility: CLINIC | Age: 50
End: 2025-05-06
Payer: COMMERCIAL

## 2025-05-06 VITALS
DIASTOLIC BLOOD PRESSURE: 76 MMHG | HEIGHT: 65 IN | TEMPERATURE: 98.4 F | RESPIRATION RATE: 16 BRPM | HEART RATE: 92 BPM | SYSTOLIC BLOOD PRESSURE: 110 MMHG | WEIGHT: 248.4 LBS | BODY MASS INDEX: 41.38 KG/M2 | OXYGEN SATURATION: 96 %

## 2025-05-06 DIAGNOSIS — Z79.4 TYPE 2 DIABETES MELLITUS WITH HYPERGLYCEMIA, WITH LONG-TERM CURRENT USE OF INSULIN (HCC): ICD-10-CM

## 2025-05-06 DIAGNOSIS — E11.65 TYPE 2 DIABETES MELLITUS WITH HYPERGLYCEMIA, WITH LONG-TERM CURRENT USE OF INSULIN (HCC): ICD-10-CM

## 2025-05-06 DIAGNOSIS — Z12.11 COLON CANCER SCREENING: ICD-10-CM

## 2025-05-06 DIAGNOSIS — R73.9 HYPERGLYCEMIA: ICD-10-CM

## 2025-05-06 DIAGNOSIS — E78.2 MIXED HYPERLIPIDEMIA: ICD-10-CM

## 2025-05-06 DIAGNOSIS — R06.83 SNORING: ICD-10-CM

## 2025-05-06 DIAGNOSIS — Z00.00 ANNUAL PHYSICAL EXAM: Primary | ICD-10-CM

## 2025-05-06 PROCEDURE — 99214 OFFICE O/P EST MOD 30 MIN: CPT | Performed by: NURSE PRACTITIONER

## 2025-05-06 PROCEDURE — 99386 PREV VISIT NEW AGE 40-64: CPT | Performed by: NURSE PRACTITIONER

## 2025-05-06 RX ORDER — ACYCLOVIR 800 MG/1
1 TABLET ORAL
Qty: 6 EACH | Refills: 3 | Status: SHIPPED | OUTPATIENT
Start: 2025-05-06 | End: 2025-05-06

## 2025-05-06 RX ORDER — ACYCLOVIR 800 MG/1
1 TABLET ORAL
Qty: 6 EACH | Refills: 3 | Status: SHIPPED | OUTPATIENT
Start: 2025-05-06

## 2025-05-06 RX ORDER — KETOROLAC TROMETHAMINE 30 MG/ML
1 INJECTION, SOLUTION INTRAMUSCULAR; INTRAVENOUS ONCE
Qty: 1 EACH | Refills: 0 | Status: SHIPPED | OUTPATIENT
Start: 2025-05-06 | End: 2025-05-06

## 2025-05-06 RX ORDER — INSULIN LISPRO 100 [IU]/ML
20 INJECTION, SOLUTION INTRAVENOUS; SUBCUTANEOUS
Start: 2025-05-06

## 2025-05-06 RX ORDER — ROSUVASTATIN CALCIUM 20 MG/1
20 TABLET, COATED ORAL DAILY
Qty: 90 TABLET | Refills: 1 | Status: SHIPPED | OUTPATIENT
Start: 2025-05-06

## 2025-05-06 RX ORDER — INSULIN GLARGINE 100 [IU]/ML
40 INJECTION, SOLUTION SUBCUTANEOUS
Qty: 45 ML | Refills: 0 | Status: SHIPPED | OUTPATIENT
Start: 2025-05-06

## 2025-05-06 NOTE — PATIENT INSTRUCTIONS
"Patient Education   Patient Education  Lantus 40 units at night  Humalog insulin which is a short acting insulin 20 units times a day with your meals Ozempic is a weekly  Drink sola tea or mint tea or dry crackers to help with the nausea  Look at your diet see what you can take away from your diet and see what you can add to take away all of the high sugar stuff and whole-grain brown rice whole-grain Posta whole-grain bread make sure you are drinking a lot of water try to walk after each meal  Diabetes and diet   The Basics   Written by the doctors and editors at [facility]   Why is diet important if I have diabetes? -- Diet is important because it is part of diabetes treatment. Many people need to change what they eat and how much they eat to help treat their diabetes. It is important for people to treat their diabetes so they:   Keep their blood sugar at goal   Prevent long-term problems, such as heart or kidney problems, that can happen in people with diabetes  Changing your diet can also help treat obesity, high blood pressure, and high cholesterol. These conditions can affect people with diabetes and can lead to future problems, such as heart attacks or strokes.  Who will help me change my diet? -- Your doctor or nurse will work with you to make a food plan to change your diet. They might also recommend that you work with a dietitian. A dietitian is an expert on food and eating.  Do I need to eat at the same times every day? -- When and how often you should eat depends, in part, on the diabetes medicines you take. For example:   People who take about the same amount of insulin at the same time each day (called a \"fixed regimen\") should eat meals at the same times. This is also true for people who take pills that increase insulin levels, such as sulfonylureas. Eating meals at the same time every day helps prevent low blood sugar.   People who adjust the dose and timing of their insulin each day (called a " "\"flexible regimen\") do not always have to eat meals at the same time. That's because they can time their insulin dose for before they plan to eat, and also adjust the dose for how much they plan to eat.   People who take medicines that don't usually cause low blood sugar, such as metformin, don't have to eat meals at the same time every day.  What do I need to think about when planning what to eat? -- Our bodies break down the food we eat into small pieces called carbohydrates, proteins, and fats.  When planning what to eat, people with diabetes need to think about:   Carbohydrates (or \"carbs\") - These are sugars the body uses for energy. They can raise your blood sugar level. Your doctor, nurse, or dietitian will tell you how many carbs you should eat at each meal or snack. Foods that have carbs include:   Bread, pasta, and rice   Vegetables and fruits   Dairy foods   Foods and drinks with added sugar  It is best to get your carbs from fruits, vegetables, whole grains, and low-fat milk. It is best to avoid drinks with added sugar, like soda, juices, and sports drinks.   Protein - Your doctor, nurse, or dietitian will tell you how much protein you should eat each day. It is best to eat lean meats, fish, eggs, beans, peas, soy products, nuts, and seeds. Avoid or limit processed meats like rodriguez, hot dogs, and sausages.   Fats - The type of fat you eat is more important than the amount of fat. \"Saturated\" and \"trans\" fats can increase your risk for heart problems, like a heart attack.   Foods that have saturated fats include meat, butter, cheese, and ice cream.   Foods that have trans fats include processed food with \"partially hydrogenated oils\" on the ingredient list. This might include fried foods, store-bought cookies, muffins, pies, and cakes.  \"Monounsaturated\" and \"polyunsaturated\" fats are better for you. Foods with these types of fat include fish, avocado, olive oil, and nuts.   Calories - You need to eat a " certain amount of calories each day to keep your weight the same. If you have excess body weight and want to lose weight, you need to eat fewer calories each day.   Fiber - Eating foods with a lot of fiber can help manage your blood sugar level. Foods that have a lot of fiber include apples, green beans, peas, beans, lentils, nuts, oatmeal, and whole grains.   Salt - People who have high blood pressure should not eat foods that contain a lot of salt (also called sodium). People with high blood pressure should also eat healthy foods, such as fruits, vegetables, and low-fat dairy foods.   Alcohol and sugary drinks - Having more than 1 alcoholic drink (for females) or 2 drinks (for males) a day can raise blood sugar levels. Also, sugary drinks like fruit juice or soda can raise blood sugar levels.  How can I lose weight? -- You can:   Exercise - Try to get at least 30 minutes of physical activity a day, most days of the week. Even gentle exercise, like walking, is good for your health. Some people with diabetes need to change their medicine dose before they exercise. They might also need to check their blood sugar levels before and after exercising.   Eat fewer calories - Your doctor, nurse, or dietitian can tell you how many calories you should eat each day to lose weight.  If you are worried about your weight, size, or shape, talk with your doctor, nurse, or dietitian. They can help you make changes to improve your health.  Can I eat the same foods as my family? -- Yes. You do not need to eat special foods if you have diabetes. You and your family can eat the same foods. Changing your diet is mostly about eating healthy foods in healthy amounts.  What are the other parts of diabetes treatment? -- Besides changing your diet, the other parts of diabetes treatment are:   Exercise   Medicines  Some people with diabetes need to learn how to match their diet and exercise with their medicine dose. For example, people who  use insulin might need to choose the dose of insulin they give themselves. To choose their dose, they need to think about:   What they plan to eat at the next meal   How much exercise they plan to do   What their blood sugar level is  If the diet and exercise do not match the medicine dose, a person's blood sugar level can get too low or too high. Blood sugar levels that are too low or too high can cause problems.  All topics are updated as new evidence becomes available and our peer review process is complete.  This topic retrieved from [employer] on: Mar 27, 2024.  Topic 58367 Version 13.0  Release: 32.2.4 - C32.85  © 2024 UpToDate, Inc. and/or its affiliates. All rights reserved.  Consumer Information Use and Disclaimer   Disclaimer: This generalized information is a limited summary of diagnosis, treatment, and/or medication information. It is not meant to be comprehensive and should be used as a tool to help the user understand and/or assess potential diagnostic and treatment options. It does NOT include all information about conditions, treatments, medications, side effects, or risks that may apply to a specific patient. It is not intended to be medical advice or a substitute for the medical advice, diagnosis, or treatment of a health care provider based on the health care provider's examination and assessment of a patient's specific and unique circumstances. Patients must speak with a health care provider for complete information about their health, medical questions, and treatment options, including any risks or benefits regarding use of medications. This information does not endorse any treatments or medications as safe, effective, or approved for treating a specific patient. UpToDate, Inc. and its affiliates disclaim any warranty or liability relating to this information or the use thereof.The use of this information is governed by the Terms of Use, available at  "https://www.woltersSpreadsaveuwer.com/en/know/clinical-effectiveness-terms. 2024© UpToDate, Inc. and its affiliates and/or licensors. All rights reserved.  Copyright   © 2024 UpToDate, Inc. and/or its affiliates. All rights reserved.    Routine physical for adults   The Basics   Written by the doctors and editors at [employer]   What is a physical? -- A physical is a routine visit, or \"check-up,\" with your doctor. You might also hear it called a \"wellness visit\" or \"preventive visit.\"  During each visit, the doctor will:   Ask about your physical and mental health   Ask about your habits, behaviors, and lifestyle   Do an exam   Give you vaccines if needed   Talk to you about any medicines you take   Give advice about your health   Answer your questions  Getting regular check-ups is an important part of taking care of your health. It can help your doctor find and treat any problems you have. But it's also important for preventing health problems.  A routine physical is different from a \"sick visit.\" A sick visit is when you see a doctor because of a health concern or problem. Since physicals are scheduled ahead of time, you can think about what you want to ask the doctor.  How often should I get a physical? -- It depends on your age and health. In general, for people age 21 years and older:   If you are younger than 50 years, you might be able to get a physical every 3 years.   If you are 50 years or older, your doctor might recommend a physical every year.  If you have an ongoing health condition, like diabetes or high blood pressure, your doctor will probably want to see you more often.  What happens during a physical? -- In general, each visit will include:   Physical exam - The doctor or nurse will check your height, weight, heart rate, and blood pressure. They will also look at your eyes and ears. They will ask about how you are feeling and whether you have any symptoms that bother you.   Medicines - It's a good idea to " "bring a list of all the medicines you take to each doctor visit. Your doctor will talk to you about your medicines and answer any questions. Tell them if you are having any side effects that bother you. You should also tell them if you are having trouble paying for any of your medicines.   Habits and behaviors - This includes:   Your diet   Your exercise habits   Whether you smoke, drink alcohol, or use drugs   Whether you are sexually active   Whether you feel safe at home  Your doctor will talk to you about things you can do to improve your health and lower your risk of health problems. They will also offer help and support. For example, if you want to quit smoking, they can give you advice and might prescribe medicines. If you want to improve your diet or get more physical activity, they can help you with this, too.   Lab tests, if needed - The tests you get will depend on your age and situation. For example, your doctor might want to check your:   Cholesterol   Blood sugar   Iron level   Vaccines - The recommended vaccines will depend on your age, health, and what vaccines you already had. Vaccines are very important because they can prevent certain serious or deadly infections.   Discussion of screening - \"Screening\" means checking for diseases or other health problems before they cause symptoms. Your doctor can recommend screening based on your age, risk, and preferences. This might include tests to check for:   Cancer, such as breast, prostate, cervical, ovarian, colorectal, prostate, lung, or skin cancer   Sexually transmitted infections, such as chlamydia and gonorrhea   Mental health conditions like depression and anxiety  Your doctor will talk to you about the different types of screening tests. They can help you decide which screenings to have. They can also explain what the results might mean.   Answering questions - The physical is a good time to ask the doctor or nurse questions about your health. If " needed, they can refer you to other doctors or specialists, too.  Adults older than 65 years often need other care, too. As you get older, your doctor will talk to you about:   How to prevent falling at home   Hearing or vision tests   Memory testing   How to take your medicines safely   Making sure that you have the help and support you need at home  All topics are updated as new evidence becomes available and our peer review process is complete.  This topic retrieved from [employer] on: May 02, 2024.  Topic 804754 Version 1.0  Release: 32.4.3 - C32.122  © 2024 UpToDate, Inc. and/or its affiliates. All rights reserved.  Consumer Information Use and Disclaimer   Disclaimer: This generalized information is a limited summary of diagnosis, treatment, and/or medication information. It is not meant to be comprehensive and should be used as a tool to help the user understand and/or assess potential diagnostic and treatment options. It does NOT include all information about conditions, treatments, medications, side effects, or risks that may apply to a specific patient. It is not intended to be medical advice or a substitute for the medical advice, diagnosis, or treatment of a health care provider based on the health care provider's examination and assessment of a patient's specific and unique circumstances. Patients must speak with a health care provider for complete information about their health, medical questions, and treatment options, including any risks or benefits regarding use of medications. This information does not endorse any treatments or medications as safe, effective, or approved for treating a specific patient. UpToDate, Inc. and its affiliates disclaim any warranty or liability relating to this information or the use thereof.The use of this information is governed by the Terms of Use, available at https://www.woltersHookipa Biotechuwer.com/en/know/clinical-effectiveness-terms. 2024© UpToDate, Inc. and its affiliates  and/or licensors. All rights reserved.  Copyright   © 2024 [employer] and/or its affiliates. All rights reserved.

## 2025-05-06 NOTE — PROGRESS NOTES
Adult Annual Physical  Name: Param Somers      : 1975      MRN: [id number]  Encounter Provider: NALLELY Canales  Encounter Date: 2025   Encounter department: [facility]    :  Assessment & Plan  Annual physical exam  Annual physical completed.  Patient newly diagnosed diabetic patient education provided.  Will discuss colon cancer screening and PSA screening at next office visit.       Type 2 diabetes mellitus with hyperglycemia, with long-term current use of insulin ([facility])    Lab Results   Component Value Date    HGBA1C 12.0 (H) 2025     Hemoglobin A1c is very elevated.  Napoleon ordered.  Patient has several hypoglycemic episodes.  Will decrease Humalog dosage, will also decrease Lantus.  Ozempic added.  Information given on diabetic diet.  Advised to keep log bring to next office visit will follow-up in 2 weeks to review fasting glucose, diet.  Foot exam done in office.  Referral made for eye exam  Orders:    Ambulatory Referral to Ophthalmology; Future    Continuous Glucose  (FreeStyle Napoleon 3 [place]) ANDREA; Use 1 each 1 (one) time for 1 dose    glucose 4 g chewable tablet; Chew 4 tablets (16 g total) once as needed for low blood sugar for up to 1 dose    semaglutide, 0.25 or 0.5 mg/dose, (Ozempic, 0.25 or 0.5 MG/DOSE,) 2 mg/3 mL injection pen; Inject 0.375 mL (0.25 mg total) under the skin every 7 days    Continuous Glucose Sensor (FreeStyle Napoleon 3 Sensor) MISC; Use 1 each every 14 (fourteen) days    Continuous Glucose Sensor (FreeStyle Napoleon 3 Sensor) MISC; Use 1 each every 14 (fourteen) days    Hyperglycemia    Orders:    insulin lispro (HumaLOG KwikPen) 100 units/mL injection pen; Inject 20 Units under the skin 3 (three) times a day with meals    Insulin Glargine Solostar (Lantus SoloStar) 100 UNIT/ML SOPN; Inject 0.4 mL (40 Units total) under the skin daily at bedtime    Colon cancer screening    Orders:    Ambulatory referral to Gastroenterology;  Future    Snoring  Reports increased episodes of snoring elevated BMI.  Referral made for sleep medicine  Orders:    Ambulatory Referral to Sleep Medicine; Future        Preventive Screenings:  - Diabetes Screening: screening not indicated and has diabetes  - Cholesterol Screening: screening not indicated and has hyperlipidemia   - Hepatitis C screening: patient declines   - HIV screening: patient declines   - Colon cancer screening: patient declines   - Lung cancer screening: screening not indicated   - Prostate cancer screening: patient declines     Immunizations:  - Immunizations due: Prevnar 20 and Zoster (Shingrix)    Counseling/Anticipatory Guidance:  - Alcohol: discussed moderation in alcohol intake and recommendations for healthy alcohol use.   - Drug use: discussed harms of illicit drug use and how it can negatively impact mental/physical health.   - Tobacco use: discussed harms of tobacco use and management options for quitting.   - Dental health: discussed importance of regular tooth brushing, flossing, and dental visits.   - Sexual health: discussed sexually transmitted diseases, partner selection, use of condoms, avoidance of unintended pregnancy, and contraceptive alternatives.   - Diet: discussed recommendations for a healthy/well-balanced diet.   - Exercise: the importance of regular exercise/physical activity was discussed. Recommend exercise 3-5 times per week for at least 30 minutes.   - Injury prevention: discussed safety/seat belts, safety helmets, smoke detectors, carbon monoxide detectors, and smoking near bedding or upholstery.       Depression Screening and Follow-up Plan: Patient was screened for depression during today's encounter. They screened negative with a PHQ-2 score of 0.          History of Present Illness     Adult Annual Physical:  Patient presents for annual physical.     Diet and Physical Activity:  - Diet/Nutrition: low carb diet.  - Exercise: no formal exercise.    Depression  "Screening:  - PHQ-2 Score: 0    General Health:  - Sleep: snores loudly, daytime hypersomnolence and unrefreshing sleep.  - Hearing: normal hearing bilateral ears.  - Vision: most recent eye exam > 1 year ago.  - Dental: no dental visits for > 1 year.     Health:  - History of STDs: no.   - Urinary symptoms: none.     Advanced Care Planning:  - Has an advanced directive?: no    - Has a durable medical POA?: no    - ACP document given to patient?: no      Review of Systems   Constitutional:  Positive for fatigue.   HENT: Negative.     Eyes: Negative.    Respiratory: Negative.     Cardiovascular: Negative.    Gastrointestinal:  Positive for abdominal distention (Hernia).   Endocrine: Negative.    Genitourinary: Negative.    Musculoskeletal: Negative.    Skin: Negative.    Allergic/Immunologic: Negative.    Neurological: Negative.    Hematological: Negative.    Psychiatric/Behavioral:  Positive for sleep disturbance.          Objective   /76 (BP Location: Left arm, Patient Position: Sitting, Cuff Size: Extra-Large)   Pulse 92   Temp 98.4 °F (36.9 °C) (Temporal)   Resp 16   Ht 5' 5\" (1.651 m)   Wt 113 kg (248 lb 6.4 oz)   SpO2 96%   BMI 41.34 kg/m²     Physical Exam  Vitals and nursing note reviewed.   Constitutional:       General: He is not in acute distress.     Appearance: He is well-developed. He is obese.   HENT:      Head: Normocephalic and atraumatic.      Right Ear: Tympanic membrane normal.      Left Ear: Tympanic membrane normal.      Nose: Nose normal.   Cardiovascular:      Rate and Rhythm: Normal rate and regular rhythm.      Pulses: Normal pulses. no weak pulses.           Dorsalis pedis pulses are 2+ on the right side and 2+ on the left side.        Posterior tibial pulses are 2+ on the right side and 2+ on the left side.      Heart sounds: Normal heart sounds. No murmur heard.  Pulmonary:      Effort: Pulmonary effort is normal. No respiratory distress.      Breath sounds: Normal breath " sounds.   Abdominal:      Palpations: Abdomen is soft.      Tenderness: There is no abdominal tenderness.      Hernia: A hernia is present.   Musculoskeletal:         General: No swelling.      Cervical back: Normal range of motion and neck supple.   Feet:      Right foot:      Skin integrity: No ulcer, skin breakdown, erythema, warmth, callus or dry skin.      Left foot:      Skin integrity: No ulcer, skin breakdown, erythema, warmth, callus or dry skin.   Skin:     General: Skin is warm and dry.      Capillary Refill: Capillary refill takes less than 2 seconds.   Neurological:      General: No focal deficit present.      Mental Status: He is alert and oriented to person, place, and time.   Psychiatric:         Mood and Affect: Mood normal.         Behavior: Behavior normal.         Thought Content: Thought content normal.         Judgment: Judgment normal.     Diabetic Foot Exam    Patient's shoes and socks removed.    Right Foot/Ankle   Right Foot Inspection  Skin Exam: skin normal and skin intact. No dry skin, no warmth, no callus, no erythema, no maceration, no abnormal color, no pre-ulcer, no ulcer and no callus.     Toe Exam: ROM and strength within normal limits.     Sensory   Vibration: intact  Proprioception: intact  Monofilament testing: intact    Vascular  Capillary refills: < 3 seconds  The right DP pulse is 2+. The right PT pulse is 2+.     Right Toe  - Comprehensive Exam  Ecchymosis: none  Arch: normal  Hammertoes: absent  Claw Toes: absent  Swelling: none   Tenderness: none       Left Foot/Ankle  Left Foot Inspection  Skin Exam: skin normal and skin intact. No dry skin, no warmth, no erythema, no maceration, normal color, no pre-ulcer, no ulcer and no callus.     Toe Exam: ROM and strength within normal limits.     Sensory   Vibration: intact  Proprioception: intact  Monofilament testing: intact    Vascular  Capillary refills: < 3 seconds  The left DP pulse is 2+. The left PT pulse is 2+.     Left  Toe  - Comprehensive Exam  Ecchymosis: none  Arch: normal  Hammertoes: absent  Claw toes: absent  Swelling: none   Tenderness: none       Assign Risk Category  No deformity present  No loss of protective sensation  No weak pulses  Risk: 0

## 2025-05-07 ENCOUNTER — TELEPHONE (OUTPATIENT)
Age: 50
End: 2025-05-07

## 2025-05-07 ENCOUNTER — TELEPHONE (OUTPATIENT)
Dept: FAMILY MEDICINE CLINIC | Facility: CLINIC | Age: 50
End: 2025-05-07

## 2025-05-07 NOTE — TELEPHONE ENCOUNTER
PA for (FreeStyle Napoleon 3 [place]) SUBMITTED to     via    []CMM-KEY:   [x]Surescripts-Case ID # [id number]   []Availity-Auth ID # NDC #   []Faxed to plan   []Other website   []Phone call Case ID #     [x]PA sent as URGENT    All office notes, labs and other pertaining documents and studies sent. Clinical questions answered. Awaiting determination from insurance company.     Turnaround time for your insurance to make a decision on your Prior Authorization can take 7-21 business days.

## 2025-05-07 NOTE — TELEPHONE ENCOUNTER
PA for (FreeStyle Napoleon 3 Sensor) SUBMITTED to     via    []CMM-KEY:   [x]Surescripts-Case ID # [id number]     []Availity-Auth ID # NDC #   []Faxed to plan   []Other website   []Phone call Case ID #     [x]PA sent as URGENT    All office notes, labs and other pertaining documents and studies sent. Clinical questions answered. Awaiting determination from insurance company.     Turnaround time for your insurance to make a decision on your Prior Authorization can take 7-21 business days.

## 2025-05-09 NOTE — TELEPHONE ENCOUNTER
PA for (FreeStyle Napoleon 3 Sensor)  APPROVED     Date(s) approved  May 7, 2025 to May 7, 2026     Case #[id number]     Patient advised by          []MyChart Message  []Phone call   []LMOM  []L/M to call office as no active Communication consent on file  []Unable to leave detailed message as VM not approved on Communication consent       Pharmacy advised by    [x]Fax  []Phone call  []Secure Chat       Approval letter scanned into Media No not available at this time

## 2025-05-13 LAB
LEFT EYE DIABETIC RETINOPATHY: NORMAL
RIGHT EYE DIABETIC RETINOPATHY: NORMAL

## 2025-06-03 ENCOUNTER — TELEPHONE (OUTPATIENT)
Dept: NEUROLOGY | Facility: CLINIC | Age: 50
End: 2025-06-03

## 2025-06-03 NOTE — TELEPHONE ENCOUNTER
LVM confirming appointment with Dr. Young for 6/4/25 at 12:30 PM.  Gave [phone] to reschedule if needed.

## 2025-06-04 ENCOUNTER — OFFICE VISIT (OUTPATIENT)
Age: 50
End: 2025-06-04
Attending: NURSE PRACTITIONER
Payer: COMMERCIAL

## 2025-06-04 VITALS
DIASTOLIC BLOOD PRESSURE: 72 MMHG | WEIGHT: 230.1 LBS | HEIGHT: 65 IN | BODY MASS INDEX: 38.34 KG/M2 | SYSTOLIC BLOOD PRESSURE: 122 MMHG

## 2025-06-04 DIAGNOSIS — E66.812 CLASS 2 OBESITY DUE TO EXCESS CALORIES WITHOUT SERIOUS COMORBIDITY WITH BODY MASS INDEX (BMI) OF 38.0 TO 38.9 IN ADULT: ICD-10-CM

## 2025-06-04 DIAGNOSIS — E66.09 CLASS 2 OBESITY DUE TO EXCESS CALORIES WITHOUT SERIOUS COMORBIDITY WITH BODY MASS INDEX (BMI) OF 38.0 TO 38.9 IN ADULT: ICD-10-CM

## 2025-06-04 DIAGNOSIS — G47.19 EXCESSIVE DAYTIME SLEEPINESS: Primary | ICD-10-CM

## 2025-06-04 PROCEDURE — 99204 OFFICE O/P NEW MOD 45 MIN: CPT | Performed by: INTERNAL MEDICINE

## 2025-06-04 NOTE — ASSESSMENT & PLAN NOTE
Counseled patient on lifestyle modifications including diet and exercise  Explained that weight loss will decrease the severity of sleep apnea    Orders:    Ambulatory Referral to Weight Management; Future

## 2025-06-04 NOTE — PROGRESS NOTES
Name: Param Somers      : 1975      MRN: [id number]  Encounter Provider: Jameel Young MD  Encounter Date: 2025   Encounter department: [facility]EP [facility]IC[facility] [place]    :  Assessment & Plan  Excessive daytime sleepiness  Suspected sleep apnea  Mallampati class 4, Body mass index is 38.29 kg/m².,  .    At risk for obstructive sleep apnea based on STOP BANG survey based on snoring, tiredness, observed apneas, elevated BMI, male gender, age    I discussed in depth the diagnostic studies and treatment options involved with obstructive sleep apnea  I also discussed in depth the risk of leaving sleep apnea untreated including hypertension, heart failure, arrhythmia, MI and stroke.      Plan  Ordered home sleep study  Patient is amenable to CPAP    Orders:  •  Home Sleep Study; Future    Class 2 obesity due to excess calories without serious comorbidity with body mass index (BMI) of 38.0 to 38.9 in adult  Counseled patient on lifestyle modifications including diet and exercise  Explained that weight loss will decrease the severity of sleep apnea    Orders:  •  Ambulatory Referral to Weight Management; Future      History of Present Illness     50-year-old male with a past medical history of type 2 diabetes who presents for evaluation of suspected sleep apnea.  He reports excessive daytime sleepiness with an Cerro score of 23.  He reports loud snoring, choking, gagging, snorting, gasping, and witnessed apneas.  He goes to bed at 11 PM and wakes up at 6:45 AM.  He wakes up 3 times at night.  He falls asleep while he is doing his notes.    He presents today with his girlfriend Donna                Sitting and reading: High chance of dozing  Watching TV: High chance of dozing  Sitting, inactive in a public place (e.g. a theatre or a meeting): High chance of dozing  As a passenger in a car for an hour without a break: High chance of dozing  Lying down to rest in the afternoon when circumstances permit:  High chance of dozing  Sitting and talking to someone: High chance of dozing  Sitting quietly after a lunch without alcohol: High chance of dozing  In a car, while stopped for a few minutes in traffic: Moderate chance of dozing  Total score: 23       Review of Systems   Constitutional: Negative.    HENT: Negative.     Eyes: Negative.    Respiratory: Negative.     Cardiovascular: Negative.    Gastrointestinal: Negative.    Endocrine: Negative.    Genitourinary: Negative.    Musculoskeletal: Negative.    Skin: Negative.    Allergic/Immunologic: Negative.    Neurological: Negative.    Hematological: Negative.    Psychiatric/Behavioral: Negative.       Pertinent positives/negatives included in HPI and also as noted:       Pertinent Medical History           Medical History Reviewed by provider this encounter:     .  Past Medical History   Past Medical History[1]  Past Surgical History[2]  Family History[3]   reports that he has never smoked. He has never used smokeless tobacco. He reports that he does not currently use alcohol. He reports that he does not use drugs.  Current Outpatient Medications   Medication Instructions   • albuterol (PROVENTIL HFA,VENTOLIN HFA) 90 mcg/act inhaler 2 puffs, Inhalation, Every 4 hours PRN   • Alcohol Swabs 70 % PADS May substitute brand based on insurance coverage. Check glucose TID.   • Blood Glucose Monitoring Suppl (OneTouch Verio Reflect) w/Device KIT May substitute brand based on insurance coverage. Check glucose TID.   • Continuous Glucose Sensor (FreeStyle Napoleon 3 Sensor) MISC 1 each, Does not apply, Every 14 days   • Continuous Glucose Sensor (FreeStyle Napoleon 3 Sensor) MISC 1 each, Does not apply, Every 14 days   • glucose blood (OneTouch Verio) test strip May substitute brand based on insurance coverage. Check glucose TID.   • glucose 16 g, Oral, Once as needed   • Insulin Glargine Solostar (LANTUS SOLOSTAR) 40 Units, Subcutaneous, Daily at bedtime   • insulin lispro (HUMALOG  "KWIKPEN) 20 Units, Subcutaneous, 3 times daily with meals   • Insulin Pen Needle (BD Pen Needle Pauly 2nd Gen) 32G X 4 MM MISC For use with insulin pen. Pharmacy may dispense brand covered by insurance.   • Insulin Pen Needle (BD Pen Needle Pauly 2nd Gen) 32G X 4 MM MISC For use with insulin pen. Pharmacy may dispense brand covered by insurance.   • Insulin Pen Needle (BD Pen Needle Pauly 2nd Gen) 32G X 4 MM MISC For use with insulin pen. Pharmacy may dispense brand covered by insurance.   • Insulin Pen Needle (BD Pen Needle Pauly 2nd Gen) 32G X 4 MM MISC For use with insulin pen. Pharmacy may dispense brand covered by insurance.   • OneTouch Delica Lancets 33G MISC May substitute brand based on insurance coverage. Check glucose TID.   • rosuvastatin (CRESTOR) 20 mg, Oral, Daily   • semaglutide (0.25 or 0.5 mg/dose) (OZEMPIC (0.25 OR 0.5 MG/DOSE)) 0.25 mg, Subcutaneous, Every 7 days   Allergies[4]   Medications Ordered Prior to Encounter[5]   Social History[6]  Objective   /72 (BP Location: Right arm, Patient Position: Sitting, Cuff Size: Large)   Ht 5' 5\" (1.651 m)   Wt 104 kg (230 lb 1.6 oz)   BMI 38.29 kg/m²        Physical Exam  Vitals reviewed.   HENT:      Head: Normocephalic and atraumatic.      Nose: Nose normal.      Mouth/Throat:      Mouth: Mucous membranes are moist.     Eyes:      Extraocular Movements: Extraocular movements intact.      Pupils: Pupils are equal, round, and reactive to light.       Cardiovascular:      Rate and Rhythm: Normal rate and regular rhythm.      Pulses: Normal pulses.   Pulmonary:      Effort: Pulmonary effort is normal.      Breath sounds: Normal breath sounds.   Abdominal:      General: Abdomen is flat. Bowel sounds are normal.      Palpations: Abdomen is soft.     Musculoskeletal:         General: Normal range of motion.      Cervical back: Normal range of motion.     Skin:     General: Skin is warm.     Neurological:      Mental Status: He is alert. Mental status is " "at baseline.     Psychiatric:         Mood and Affect: Mood normal.     Visit Vitals  /72 (BP Location: Right arm, Patient Position: Sitting, Cuff Size: Large)   Ht 5' 5\" (1.651 m)   Wt 104 kg (230 lb 1.6 oz)   BMI 38.29 kg/m²   Smoking Status Never   BSA 2.1 m²         Data  Lab Results   Component Value Date    HGB 15.6 04/26/2025    HCT 46.1 04/26/2025    MCV 91 04/26/2025      Lab Results   Component Value Date    CALCIUM 8.8 04/26/2025    K 4.0 04/26/2025    CO2 26 04/26/2025     04/26/2025    BUN 10 04/26/2025    CREATININE 0.77 04/26/2025     No results found for: \"IRON\", \"TIBC\", \"FERRITIN\"  Lab Results   Component Value Date    AST 34 04/26/2025    ALT 38 04/26/2025                    [1]  Past Medical History:  Diagnosis Date   • DM (diabetes mellitus) ([facility])    • Type 2 diabetes mellitus with hemoglobin A1c goal of less than 7.0% ([facility]) 8/21/2023   [2]  Past Surgical History:  Procedure Laterality Date   • APPENDECTOMY     [3]  Family History  Problem Relation Name Age of Onset   • Cancer Mother     • Cancer Father     [4]  Allergies  Allergen Reactions   • Other Other (See Comments)   [5]  Current Outpatient Medications on File Prior to Visit   Medication Sig Dispense Refill   • albuterol (PROVENTIL HFA,VENTOLIN HFA) 90 mcg/act inhaler Inhale 2 puffs every 4 (four) hours as needed for wheezing or shortness of breath 8 g 0   • Alcohol Swabs 70 % PADS May substitute brand based on insurance coverage. Check glucose TID. 100 each 0   • Blood Glucose Monitoring Suppl (OneTouch Verio Reflect) w/Device KIT May substitute brand based on insurance coverage. Check glucose TID. 1 kit 0   • Continuous Glucose Sensor (FreeStyle Napoleon 3 Sensor) MISC Use 1 each every 14 (fourteen) days 6 each 3   • Continuous Glucose Sensor (FreeStyle Napoleon 3 Sensor) MISC Use 1 each every 14 (fourteen) days 6 each 3   • glucose 4 g chewable tablet Chew 4 tablets (16 g total) once as needed for low blood sugar for up to 1 dose " 30 tablet 0   • glucose blood (OneTouch Verio) test strip May substitute brand based on insurance coverage. Check glucose TID. 100 each 0   • Insulin Glargine Solostar (Lantus SoloStar) 100 UNIT/ML SOPN Inject 0.4 mL (40 Units total) under the skin daily at bedtime 45 mL 0   • insulin lispro (HumaLOG KwikPen) 100 units/mL injection pen Inject 20 Units under the skin 3 (three) times a day with meals     • Insulin Pen Needle (BD Pen Needle Pauly 2nd Gen) 32G X 4 MM MISC For use with insulin pen. Pharmacy may dispense brand covered by insurance. 100 each 0   • Insulin Pen Needle (BD Pen Needle Pauly 2nd Gen) 32G X 4 MM MISC For use with insulin pen. Pharmacy may dispense brand covered by insurance. 100 each 0   • Insulin Pen Needle (BD Pen Needle Pauly 2nd Gen) 32G X 4 MM MISC For use with insulin pen. Pharmacy may dispense brand covered by insurance. 100 each 0   • Insulin Pen Needle (BD Pen Needle Pauly 2nd Gen) 32G X 4 MM MISC For use with insulin pen. Pharmacy may dispense brand covered by insurance. 100 each 0   • OneTouch Delica Lancets 33G MISC May substitute brand based on insurance coverage. Check glucose TID. 100 each 0   • rosuvastatin (CRESTOR) 20 MG tablet Take 1 tablet (20 mg total) by mouth daily 90 tablet 1   • semaglutide, 0.25 or 0.5 mg/dose, (Ozempic, 0.25 or 0.5 MG/DOSE,) 2 mg/3 mL injection pen Inject 0.375 mL (0.25 mg total) under the skin every 7 days 2 mL 0     No current facility-administered medications on file prior to visit.   [6]  Social History  Tobacco Use   • Smoking status: Never   • Smokeless tobacco: Never   Vaping Use   • Vaping status: Never Used   Substance and Sexual Activity   • Alcohol use: Not Currently   • Drug use: No

## 2025-06-05 ENCOUNTER — PREP FOR PROCEDURE (OUTPATIENT)
Age: 50
End: 2025-06-05

## 2025-06-05 ENCOUNTER — CONSULT (OUTPATIENT)
Age: 50
End: 2025-06-05
Payer: COMMERCIAL

## 2025-06-05 VITALS
HEART RATE: 102 BPM | SYSTOLIC BLOOD PRESSURE: 122 MMHG | OXYGEN SATURATION: 94 % | DIASTOLIC BLOOD PRESSURE: 80 MMHG | BODY MASS INDEX: 39.82 KG/M2 | WEIGHT: 239 LBS | HEIGHT: 65 IN

## 2025-06-05 DIAGNOSIS — Z12.11 SCREENING FOR COLON CANCER: Primary | ICD-10-CM

## 2025-06-05 PROCEDURE — 99203 OFFICE O/P NEW LOW 30 MIN: CPT | Performed by: INTERNAL MEDICINE

## 2025-06-05 NOTE — PATIENT INSTRUCTIONS
Scheduled date of colonoscopy (as of today): 7/19/25  Physician performing colonoscopy: Kath  Location of colonoscopy: [facility]  Bowel prep reviewed with patient: Miralax  Instructions reviewed with patient by: Lennie DORADO  Clearances:

## 2025-06-05 NOTE — PROGRESS NOTES
"Name: Param Somers      : 1975      MRN: [id number]  Encounter Provider: Royer Stockton MD  Encounter Date: 2025   Encounter department: [facility] [place]  :  Assessment & Plan  Screening for colon cancer    Orders:    Colonoscopy; Future    Patient will be scheduled for initial average risk screening.  Further recommendations will depend on study results    History of Present Illness   HPI  Param Somers is a 50 y.o. male who presents for initial average risk screening colonoscopy.  No prior colonoscopy.  Patient has no GI symptomatology of any kind.  Recent hospitalization for hypoglycemia.  He is currently on a weight loss program and taking insulin for his diabetes.  He has no symptomatology referable to this at this time.  No family history of colon cancer.  Patient has an umbilical hernia.  He is also cautioned if it starts to hurt to go right to the emergency room.      Review of Systems   All other systems reviewed and are negative.         Objective   /80   Pulse 102   Ht 5' 5\" (1.651 m)   Wt 108 kg (239 lb)   SpO2 94%   BMI 39.77 kg/m²      Physical Exam  Constitutional:       Appearance: He is obese.   HENT:      Head: Normocephalic.     Eyes:      Pupils: Pupils are equal, round, and reactive to light.       Cardiovascular:      Rate and Rhythm: Normal rate and regular rhythm.      Pulses: Normal pulses.      Heart sounds: Normal heart sounds.   Pulmonary:      Effort: Pulmonary effort is normal.      Breath sounds: Normal breath sounds.   Abdominal:      Palpations: Abdomen is soft.      Comments: Reducible nontender umbilical hernia     Skin:     General: Skin is warm and dry.     Neurological:      General: No focal deficit present.      Mental Status: He is alert and oriented to person, place, and time.     Psychiatric:         Mood and Affect: Mood normal.         Behavior: Behavior normal.           "

## 2025-06-06 ENCOUNTER — TELEPHONE (OUTPATIENT)
Age: 50
End: 2025-06-06

## 2025-06-06 ENCOUNTER — TELEPHONE (OUTPATIENT)
Dept: FAMILY MEDICINE CLINIC | Facility: CLINIC | Age: 50
End: 2025-06-06

## 2025-06-06 ENCOUNTER — APPOINTMENT (OUTPATIENT)
Dept: RADIOLOGY | Facility: CLINIC | Age: 50
End: 2025-06-06
Payer: COMMERCIAL

## 2025-06-06 ENCOUNTER — OFFICE VISIT (OUTPATIENT)
Dept: FAMILY MEDICINE CLINIC | Facility: CLINIC | Age: 50
End: 2025-06-06
Payer: COMMERCIAL

## 2025-06-06 VITALS
SYSTOLIC BLOOD PRESSURE: 104 MMHG | OXYGEN SATURATION: 95 % | RESPIRATION RATE: 16 BRPM | DIASTOLIC BLOOD PRESSURE: 80 MMHG | HEIGHT: 65 IN | BODY MASS INDEX: 39.82 KG/M2 | TEMPERATURE: 98 F | WEIGHT: 239 LBS | HEART RATE: 102 BPM

## 2025-06-06 DIAGNOSIS — E78.5 DYSLIPIDEMIA, GOAL LDL BELOW 100: ICD-10-CM

## 2025-06-06 DIAGNOSIS — E11.65 TYPE 2 DIABETES MELLITUS WITH HYPERGLYCEMIA, WITH LONG-TERM CURRENT USE OF INSULIN (HCC): Primary | ICD-10-CM

## 2025-06-06 DIAGNOSIS — M79.641 HAND PAIN, RIGHT: ICD-10-CM

## 2025-06-06 DIAGNOSIS — E55.9 HYPOVITAMINOSIS D: ICD-10-CM

## 2025-06-06 DIAGNOSIS — Z79.4 TYPE 2 DIABETES MELLITUS WITH HYPERGLYCEMIA, WITH LONG-TERM CURRENT USE OF INSULIN (HCC): Primary | ICD-10-CM

## 2025-06-06 PROBLEM — U07.1 ACUTE HYPOXEMIC RESPIRATORY FAILURE DUE TO COVID-19 (HCC): Status: RESOLVED | Noted: 2022-01-10 | Resolved: 2025-06-06

## 2025-06-06 PROBLEM — J96.01 ACUTE HYPOXEMIC RESPIRATORY FAILURE DUE TO COVID-19 (HCC): Status: RESOLVED | Noted: 2022-01-10 | Resolved: 2025-06-06

## 2025-06-06 PROCEDURE — 73130 X-RAY EXAM OF HAND: CPT

## 2025-06-06 PROCEDURE — 99214 OFFICE O/P EST MOD 30 MIN: CPT | Performed by: NURSE PRACTITIONER

## 2025-06-06 NOTE — TELEPHONE ENCOUNTER
Donna nascimento, stated pharmacy informed her that a prior auth is need for semaglutide, 0.25 or 0.5 mg/dose, (Ozempic, 0.25 or 0.5 MG/DOSE,) 2 mg/3 mL injection pen .      Please advise, thank you.

## 2025-06-06 NOTE — PROGRESS NOTES
Name: Param Somers      : 1975      MRN: [id number]  Encounter Provider: NALLELY Canales  Encounter Date: 2025   Encounter department: [facility] PRIMARY CARE  :  Assessment & Plan  Type 2 diabetes mellitus with hyperglycemia, with long-term current use of insulin ([facility])    Lab Results   Component Value Date    HGBA1C 12.0 (H) 2025     Patient has been taking Lantus so advised not to take the Basaglar.  Continue with Humalog, Lantus.  Ozempic reordered.  Continue low carbohydrate diet  Orders:    semaglutide, 0.25 or 0.5 mg/dose, (Ozempic, 0.25 or 0.5 MG/DOSE,) 2 mg/3 mL injection pen; Inject 0.375 mL (0.25 mg total) under the skin every 7 days    Albumin / creatinine urine ratio; Future    Comprehensive metabolic panel; Future    Hemoglobin A1C; Future    Lipid panel; Future    TSH, 3rd generation with Free T4 reflex; Future    Hypovitaminosis D    Orders:    Vitamin D 25 hydroxy; Future    Hand pain, right  Patient has hand pain on the right.  Had an injury more than 6 months ago.  Has some tenderness.  X-ray ordered referral made to physical therapy May use Voltaren gel, also use heat  Orders:    XR hand 3+ vw right; Future    Ambulatory Referral to Physical Therapy; Future    Diclofenac Sodium (VOLTAREN) 1 %; Apply 2 g topically 4 (four) times a day    Dyslipidemia, goal LDL below 100  Denies any side effects of medication.  Continue Crestor.  Will recheck levels continue heart healthy diet               Depression Screening and Follow-up Plan: Patient was screened for depression during today's encounter. They screened negative with a PHQ-2 score of 0.        History of Present Illness   Patient is here to follow-up on diabetes.  Reports that he did receive Basaglar but has not been taking it because he has been taking Lantus.  Reports that aguila was too expensive.  Has been checking his blood sugars with fingersticks daily.      Review of Systems   Constitutional:  Positive for  "fatigue.   HENT: Negative.     Eyes: Negative.    Respiratory: Negative.     Cardiovascular: Negative.    Gastrointestinal: Negative.    Endocrine: Negative.    Genitourinary: Negative.    Musculoskeletal: Negative.    Skin: Negative.    Allergic/Immunologic: Negative.    Neurological: Negative.    Hematological: Negative.    Psychiatric/Behavioral:  Positive for agitation.        Objective   /80 (BP Location: Left arm, Patient Position: Sitting, Cuff Size: Extra-Large)   Pulse 102   Temp 98 °F (36.7 °C) (Temporal)   Resp 16   Ht 5' 5\" (1.651 m)   Wt 108 kg (239 lb)   SpO2 95%   BMI 39.77 kg/m²      Physical Exam  Vitals and nursing note reviewed.   Constitutional:       General: He is not in acute distress.     Appearance: He is well-developed. He is obese.   HENT:      Head: Normocephalic and atraumatic.     Eyes:      Conjunctiva/sclera: Conjunctivae normal.       Cardiovascular:      Rate and Rhythm: Normal rate and regular rhythm.      Heart sounds: No murmur heard.  Pulmonary:      Effort: Pulmonary effort is normal. No respiratory distress.      Breath sounds: Normal breath sounds.   Abdominal:      Palpations: Abdomen is soft.      Tenderness: There is no abdominal tenderness.     Musculoskeletal:         General: No swelling.      Cervical back: Neck supple.     Skin:     General: Skin is warm and dry.      Capillary Refill: Capillary refill takes less than 2 seconds.     Neurological:      General: No focal deficit present.      Mental Status: He is alert and oriented to person, place, and time.     Psychiatric:         Mood and Affect: Mood normal.         Behavior: Behavior normal.         Thought Content: Thought content normal.         Judgment: Judgment normal.         "

## 2025-06-06 NOTE — ASSESSMENT & PLAN NOTE
Denies any side effects of medication.  Continue Crestor.  Will recheck levels continue heart healthy diet

## 2025-06-09 NOTE — TELEPHONE ENCOUNTER
Duplicate encounter created, please see telephone encounter from 06/06/2025 regarding Ozempic 0.25 mg PA status. Please review patient's chart to see if there is already an encounter regarding the medication in question and to document anything regarding this medication in regards to anything regarding the authorization process etc before creating another encounter Thank You.

## 2025-06-09 NOTE — TELEPHONE ENCOUNTER
PA for Ozempic, 0.25 or 0.5 MG/DOSE  APPROVED     Date(s) approved June 9, 2025 to June 9, 2026     Case #[id number]     Patient advised by          []MyChart Message  []Phone call   []LMOM  []L/M to call office as no active Communication consent on file  []Unable to leave detailed message as VM not approved on Communication consent       Pharmacy advised by    [x]Fax  []Phone call  []Secure Chat    Specialty Pharmacy    []     Approval letter scanned into Media No Not available at decision

## 2025-06-24 ENCOUNTER — TELEPHONE (OUTPATIENT)
Age: 50
End: 2025-06-24

## 2025-06-24 NOTE — TELEPHONE ENCOUNTER
Received a message that patient was unable to cancel his appt on 6/25/25 w/ Lexii at 8:30.  Called patient to confirm cancellation and to see if he wanted to reschedule.    LVM for him to return call.

## 2025-06-24 NOTE — TELEPHONE ENCOUNTER
Patient attempted to cancelled appointment tomorrow with paola through [facility]. I attempted to contact to confirm appointment, left message on machine. This is just an fyi incase patient no shows for appointment tomorrow.  Thank you

## 2025-07-05 PROBLEM — Z12.11 SCREENING FOR COLON CANCER: Status: RESOLVED | Noted: 2025-06-05 | Resolved: 2025-07-05

## 2025-07-15 NOTE — TELEPHONE ENCOUNTER
PA for Ozempic, 0.25 or 0.5 MG/DOSE, SUBMITTED to     via    []CMM-KEY:   [x]Surescripts-Case ID # [id number]   []Availity-Auth ID # NDC #   []Faxed to plan   []Other website   []Phone call Case ID #     [x]PA sent as URGENT    All office notes, labs and other pertaining documents and studies sent. Clinical questions answered. Awaiting determination from insurance company.     Turnaround time for your insurance to make a decision on your Prior Authorization can take 7-21 business days.                  No

## 2025-07-19 ENCOUNTER — ANESTHESIA EVENT (OUTPATIENT)
Dept: GASTROENTEROLOGY | Facility: HOSPITAL | Age: 50
End: 2025-07-19
Payer: COMMERCIAL

## 2025-07-19 ENCOUNTER — ANESTHESIA (OUTPATIENT)
Dept: GASTROENTEROLOGY | Facility: HOSPITAL | Age: 50
End: 2025-07-19
Payer: COMMERCIAL

## 2025-07-19 ENCOUNTER — HOSPITAL ENCOUNTER (OUTPATIENT)
Dept: GASTROENTEROLOGY | Facility: HOSPITAL | Age: 50
Setting detail: OUTPATIENT SURGERY
Discharge: HOME | End: 2025-07-19
Attending: INTERNAL MEDICINE
Payer: COMMERCIAL

## 2025-07-19 VITALS
SYSTOLIC BLOOD PRESSURE: 140 MMHG | RESPIRATION RATE: 20 BRPM | OXYGEN SATURATION: 97 % | DIASTOLIC BLOOD PRESSURE: 99 MMHG | BODY MASS INDEX: 39.93 KG/M2 | HEART RATE: 84 BPM | TEMPERATURE: 97.5 F | WEIGHT: 239.64 LBS | HEIGHT: 65 IN

## 2025-07-19 DIAGNOSIS — Z12.11 SCREENING FOR COLON CANCER: ICD-10-CM

## 2025-07-19 LAB — GLUCOSE SERPL-MCNC: 127 MG/DL (ref 65–140)

## 2025-07-19 PROCEDURE — 88305 TISSUE EXAM BY PATHOLOGIST: CPT | Performed by: STUDENT IN AN ORGANIZED HEALTH CARE EDUCATION/TRAINING PROGRAM

## 2025-07-19 PROCEDURE — 82948 REAGENT STRIP/BLOOD GLUCOSE: CPT

## 2025-07-19 PROCEDURE — 45380 COLONOSCOPY AND BIOPSY: CPT | Performed by: INTERNAL MEDICINE

## 2025-07-19 RX ORDER — SODIUM CHLORIDE, SODIUM LACTATE, POTASSIUM CHLORIDE, CALCIUM CHLORIDE 600; 310; 30; 20 MG/100ML; MG/100ML; MG/100ML; MG/100ML
INJECTION, SOLUTION INTRAVENOUS CONTINUOUS PRN
Status: DISCONTINUED | OUTPATIENT
Start: 2025-07-19 | End: 2025-07-19

## 2025-07-19 RX ORDER — LIDOCAINE HYDROCHLORIDE 10 MG/ML
INJECTION, SOLUTION EPIDURAL; INFILTRATION; INTRACAUDAL; PERINEURAL AS NEEDED
Status: DISCONTINUED | OUTPATIENT
Start: 2025-07-19 | End: 2025-07-19

## 2025-07-19 RX ORDER — PROPOFOL 10 MG/ML
INJECTION, EMULSION INTRAVENOUS AS NEEDED
Status: DISCONTINUED | OUTPATIENT
Start: 2025-07-19 | End: 2025-07-19

## 2025-07-19 RX ADMIN — SODIUM CHLORIDE, SODIUM LACTATE, POTASSIUM CHLORIDE, AND CALCIUM CHLORIDE: .6; .31; .03; .02 INJECTION, SOLUTION INTRAVENOUS at 08:28

## 2025-07-19 RX ADMIN — PROPOFOL 50 MG: 10 INJECTION, EMULSION INTRAVENOUS at 08:54

## 2025-07-19 RX ADMIN — PROPOFOL 50 MG: 10 INJECTION, EMULSION INTRAVENOUS at 08:51

## 2025-07-19 RX ADMIN — PROPOFOL 50 MG: 10 INJECTION, EMULSION INTRAVENOUS at 08:49

## 2025-07-19 RX ADMIN — PROPOFOL 150 MG: 10 INJECTION, EMULSION INTRAVENOUS at 08:48

## 2025-07-19 RX ADMIN — LIDOCAINE HYDROCHLORIDE 50 MG: 10 INJECTION, SOLUTION EPIDURAL; INFILTRATION; INTRACAUDAL at 08:48

## 2025-07-19 NOTE — ANESTHESIA PREPROCEDURE EVALUATION
Procedure:  COLONOSCOPY    Relevant Problems   CARDIO   (+) Dyslipidemia, goal LDL below 100      ENDO   (+) Type 2 diabetes mellitus with hemoglobin A1c goal of less than 7.0% ([facility])   (+) Type 2 diabetes mellitus with hyperglycemia, without long-term current use of insulin ([facility])        Physical Exam    Airway     Mallampati score: II  TM Distance: >3 FB  Neck ROM: full      Cardiovascular  Rhythm: regular, Rate: normal    Dental       Pulmonary   Breath sounds clear to auscultation    Neurological      Other Findings      DM (diabetes mellitus) (HCC)    Type 2 diabetes mellitus with hemoglobin A1c goal of less than 7.0% ([facility])      Pertinent Negatives Comments   Arthritis    Asthma    CHF (congestive heart failure) ([facility])    COPD (chronic obstructive pulmonary disease) ([facility])    Coronary artery disease    History of transfusion    Hypertension    Pressure injury of skin    Psychiatric disorder    Renal disorder    Stroke ([facility])    Disease of thyroid gland    TIA (transient ischemic attack)      Anesthesia Plan  ASA Score- 2     Anesthesia Type- IV sedation with anesthesia with ASA Monitors.         Additional Monitors:     Airway Plan:            Plan Factors-Exercise tolerance (METS): >4 METS.    Chart reviewed. EKG reviewed. Imaging results reviewed. Existing labs reviewed. Patient summary reviewed.                  Induction- intravenous.    Postoperative Plan- .   Monitoring Plan - Monitoring plan - standard ASA monitoring          Informed Consent- Anesthetic plan and risks discussed with patient.  I personally reviewed this patient with the CRNA. Discussed and agreed on the Anesthesia Plan with the CRNA..      NPO Status:  No vitals data found for the desired time range.

## 2025-07-19 NOTE — H&P
"History and Physical -  [facility]  Param Somers 50 y.o. male MRN: [id number]                  HPI: Param Somers is a 50 y.o. year old male who presents for colonoscopy for initial average risk screening      REVIEW OF SYSTEMS: Per the HPI, and otherwise unremarkable.    Historical Information   Past Medical History[1]  Past Surgical History[2]  Social History   Social History     Substance and Sexual Activity   Alcohol Use Not Currently    Alcohol/week: 3.0 standard drinks of alcohol    Types: 3 Glasses of wine per week    Comment: weekends     Social History     Substance and Sexual Activity   Drug Use No     Tobacco Use History[3]  Family History[4]    Meds/Allergies     Not in a hospital admission.    Allergies[5]    Objective     Blood pressure 129/76, pulse 91, temperature 97.7 °F (36.5 °C), temperature source Temporal, resp. rate 13, height 5' 5\" (1.651 m), weight 109 kg (239 lb 10.2 oz), SpO2 95%.      PHYSICAL EXAM    Gen: NAD  CV: RRR  CHEST: Clear  ABD: soft, NT/ND  EXT: no edema  Neuro: AAO      ASSESSMENT/PLAN:  This is a 50 y.o. year old male here for initial average risk screening    PLAN:   Procedure: Colonoscopy               [1]   Past Medical History:  Diagnosis Date    Diabetes mellitus (HCC)     DM (diabetes mellitus) (HCC)     Hyperlipidemia     Type 2 diabetes mellitus with hemoglobin A1c goal of less than 7.0% (HCC) 08/21/2023   [2]   Past Surgical History:  Procedure Laterality Date    APPENDECTOMY     [3]   Social History  Tobacco Use   Smoking Status Never   Smokeless Tobacco Never   [4]   Family History  Problem Relation Name Age of Onset    Cancer Mother      Cancer Father     [5]   Allergies  Allergen Reactions    Other Other (See Comments)     "

## 2025-07-21 ENCOUNTER — RA CDI HCC (OUTPATIENT)
Dept: OTHER | Facility: HOSPITAL | Age: 50
End: 2025-07-21

## 2025-07-21 NOTE — PROGRESS NOTES
HCC coding opportunities          Chart Reviewed number of suggestions sent to Provider: 2  E11.65, Z79.4     Patients Insurance        Commercial Insurance: [employer]

## 2025-08-19 ENCOUNTER — APPOINTMENT (OUTPATIENT)
Dept: LAB | Facility: CLINIC | Age: 50
End: 2025-08-19
Payer: COMMERCIAL

## 2025-08-19 ENCOUNTER — OFFICE VISIT (OUTPATIENT)
Dept: FAMILY MEDICINE CLINIC | Facility: CLINIC | Age: 50
End: 2025-08-19
Payer: COMMERCIAL

## 2025-08-19 VITALS
DIASTOLIC BLOOD PRESSURE: 80 MMHG | OXYGEN SATURATION: 97 % | WEIGHT: 237.6 LBS | SYSTOLIC BLOOD PRESSURE: 140 MMHG | BODY MASS INDEX: 39.58 KG/M2 | HEART RATE: 102 BPM | HEIGHT: 65 IN | TEMPERATURE: 98.3 F | RESPIRATION RATE: 18 BRPM

## 2025-08-19 DIAGNOSIS — Z79.4 TYPE 2 DIABETES MELLITUS WITH HYPERGLYCEMIA, WITH LONG-TERM CURRENT USE OF INSULIN (HCC): Primary | ICD-10-CM

## 2025-08-19 DIAGNOSIS — I10 PRIMARY HYPERTENSION: ICD-10-CM

## 2025-08-19 DIAGNOSIS — E55.9 HYPOVITAMINOSIS D: ICD-10-CM

## 2025-08-19 DIAGNOSIS — E11.65 TYPE 2 DIABETES MELLITUS WITH HYPERGLYCEMIA, WITH LONG-TERM CURRENT USE OF INSULIN (HCC): ICD-10-CM

## 2025-08-19 DIAGNOSIS — Z79.4 TYPE 2 DIABETES MELLITUS WITH HYPERGLYCEMIA, WITH LONG-TERM CURRENT USE OF INSULIN (HCC): ICD-10-CM

## 2025-08-19 DIAGNOSIS — E11.65 TYPE 2 DIABETES MELLITUS WITH HYPERGLYCEMIA, WITH LONG-TERM CURRENT USE OF INSULIN (HCC): Primary | ICD-10-CM

## 2025-08-19 DIAGNOSIS — E66.01 MORBID OBESITY WITH BMI OF 40.0-44.9, ADULT (HCC): ICD-10-CM

## 2025-08-19 DIAGNOSIS — I15.2 HYPERTENSION DUE TO ENDOCRINE DISORDER: ICD-10-CM

## 2025-08-19 DIAGNOSIS — E11.620 TYPE 2 DIABETES MELLITUS WITH DIABETIC DERMATITIS, UNSPECIFIED WHETHER LONG TERM INSULIN USE (HCC): ICD-10-CM

## 2025-08-19 DIAGNOSIS — E78.5 DYSLIPIDEMIA, GOAL LDL BELOW 100: ICD-10-CM

## 2025-08-19 LAB
25(OH)D3 SERPL-MCNC: 20 NG/ML (ref 30–100)
ALBUMIN SERPL BCG-MCNC: 4.2 G/DL (ref 3.5–5)
ALP SERPL-CCNC: 93 U/L (ref 34–104)
ALT SERPL W P-5'-P-CCNC: 21 U/L (ref 7–52)
ANION GAP SERPL CALCULATED.3IONS-SCNC: 13 MMOL/L (ref 4–13)
AST SERPL W P-5'-P-CCNC: 19 U/L (ref 13–39)
BILIRUB SERPL-MCNC: 1.23 MG/DL (ref 0.2–1)
BUN SERPL-MCNC: 7 MG/DL (ref 5–25)
CALCIUM SERPL-MCNC: 9.6 MG/DL (ref 8.4–10.2)
CHLORIDE SERPL-SCNC: 104 MMOL/L (ref 96–108)
CHOLEST SERPL-MCNC: 231 MG/DL (ref ?–200)
CO2 SERPL-SCNC: 24 MMOL/L (ref 21–32)
CREAT SERPL-MCNC: 0.8 MG/DL (ref 0.6–1.3)
CREAT UR-MCNC: 290.8 MG/DL
ERYTHROCYTE [DISTWIDTH] IN BLOOD BY AUTOMATED COUNT: 13.1 % (ref 11.6–15.1)
EST. AVERAGE GLUCOSE BLD GHB EST-MCNC: 126 MG/DL
GFR SERPL CREATININE-BSD FRML MDRD: 103 ML/MIN/1.73SQ M
GLUCOSE P FAST SERPL-MCNC: 120 MG/DL (ref 65–99)
HBA1C MFR BLD: 6 %
HCT VFR BLD AUTO: 53.9 % (ref 36.5–49.3)
HDLC SERPL-MCNC: 47 MG/DL
HGB BLD-MCNC: 18.7 G/DL (ref 12–17)
LDLC SERPL CALC-MCNC: 112 MG/DL (ref 0–100)
MCH RBC QN AUTO: 32.2 PG (ref 26.8–34.3)
MCHC RBC AUTO-ENTMCNC: 34.7 G/DL (ref 31.4–37.4)
MCV RBC AUTO: 93 FL (ref 82–98)
MICROALBUMIN UR-MCNC: 825.6 MG/L
MICROALBUMIN/CREAT 24H UR: 284 MG/G CREATININE (ref 0–30)
PLATELET # BLD AUTO: 321 THOUSANDS/UL (ref 149–390)
PMV BLD AUTO: 10.1 FL (ref 8.9–12.7)
POTASSIUM SERPL-SCNC: 3.9 MMOL/L (ref 3.5–5.3)
PROT SERPL-MCNC: 8 G/DL (ref 6.4–8.4)
RBC # BLD AUTO: 5.8 MILLION/UL (ref 3.88–5.62)
SODIUM SERPL-SCNC: 141 MMOL/L (ref 135–147)
TRIGL SERPL-MCNC: 360 MG/DL (ref ?–150)
TSH SERPL DL<=0.05 MIU/L-ACNC: 2.59 UIU/ML (ref 0.45–4.5)
WBC # BLD AUTO: 9.27 THOUSAND/UL (ref 4.31–10.16)

## 2025-08-19 PROCEDURE — 99214 OFFICE O/P EST MOD 30 MIN: CPT | Performed by: NURSE PRACTITIONER

## 2025-08-19 PROCEDURE — 36415 COLL VENOUS BLD VENIPUNCTURE: CPT

## 2025-08-19 PROCEDURE — 85027 COMPLETE CBC AUTOMATED: CPT

## 2025-08-19 PROCEDURE — 83036 HEMOGLOBIN GLYCOSYLATED A1C: CPT

## 2025-08-19 PROCEDURE — 82306 VITAMIN D 25 HYDROXY: CPT

## 2025-08-19 PROCEDURE — 82043 UR ALBUMIN QUANTITATIVE: CPT

## 2025-08-19 PROCEDURE — 82570 ASSAY OF URINE CREATININE: CPT

## 2025-08-19 PROCEDURE — 84443 ASSAY THYROID STIM HORMONE: CPT

## 2025-08-19 PROCEDURE — 80053 COMPREHEN METABOLIC PANEL: CPT

## 2025-08-19 PROCEDURE — 80061 LIPID PANEL: CPT

## 2025-08-19 RX ORDER — LISINOPRIL AND HYDROCHLOROTHIAZIDE 10; 12.5 MG/1; MG/1
1 TABLET ORAL DAILY
Qty: 100 TABLET | Refills: 3 | Status: SHIPPED | OUTPATIENT
Start: 2025-08-19